# Patient Record
Sex: MALE | Race: WHITE | Employment: OTHER | URBAN - METROPOLITAN AREA
[De-identification: names, ages, dates, MRNs, and addresses within clinical notes are randomized per-mention and may not be internally consistent; named-entity substitution may affect disease eponyms.]

---

## 2020-01-01 ENCOUNTER — APPOINTMENT (OUTPATIENT)
Dept: GENERAL RADIOLOGY | Age: 85
DRG: 177 | End: 2020-01-01
Attending: INTERNAL MEDICINE
Payer: MEDICARE

## 2020-01-01 ENCOUNTER — APPOINTMENT (OUTPATIENT)
Dept: CT IMAGING | Age: 85
DRG: 177 | End: 2020-01-01
Attending: INTERNAL MEDICINE
Payer: MEDICARE

## 2020-01-01 ENCOUNTER — HOSPITAL ENCOUNTER (INPATIENT)
Age: 85
LOS: 8 days | DRG: 177 | End: 2020-08-13
Attending: INTERNAL MEDICINE | Admitting: INTERNAL MEDICINE
Payer: MEDICARE

## 2020-01-01 VITALS
WEIGHT: 160.05 LBS | TEMPERATURE: 98.3 F | OXYGEN SATURATION: 62 % | SYSTOLIC BLOOD PRESSURE: 102 MMHG | BODY MASS INDEX: 24.26 KG/M2 | RESPIRATION RATE: 15 BRPM | HEART RATE: 96 BPM | DIASTOLIC BLOOD PRESSURE: 53 MMHG | HEIGHT: 68 IN

## 2020-01-01 LAB
-: NORMAL
ABO/RH: NORMAL
ABSOLUTE EOS #: 0 K/UL (ref 0–0.4)
ABSOLUTE EOS #: 0 K/UL (ref 0–0.4)
ABSOLUTE EOS #: 0 K/UL (ref 0–0.44)
ABSOLUTE EOS #: 0 K/UL (ref 0–0.44)
ABSOLUTE EOS #: <0.03 K/UL (ref 0–0.44)
ABSOLUTE IMMATURE GRANULOCYTE: 0 K/UL (ref 0–0.3)
ABSOLUTE IMMATURE GRANULOCYTE: 0 K/UL (ref 0–0.3)
ABSOLUTE IMMATURE GRANULOCYTE: 0.08 K/UL (ref 0–0.3)
ABSOLUTE IMMATURE GRANULOCYTE: 0.09 K/UL (ref 0–0.3)
ABSOLUTE IMMATURE GRANULOCYTE: 0.14 K/UL (ref 0–0.3)
ABSOLUTE IMMATURE GRANULOCYTE: 0.21 K/UL (ref 0–0.3)
ABSOLUTE IMMATURE GRANULOCYTE: 0.21 K/UL (ref 0–0.3)
ABSOLUTE LYMPH #: 0.42 K/UL (ref 1.1–3.7)
ABSOLUTE LYMPH #: 0.46 K/UL (ref 1.1–3.7)
ABSOLUTE LYMPH #: 0.52 K/UL (ref 1–4.8)
ABSOLUTE LYMPH #: 0.69 K/UL (ref 1–4.8)
ABSOLUTE LYMPH #: 0.72 K/UL (ref 1.1–3.7)
ABSOLUTE LYMPH #: 0.77 K/UL (ref 1.1–3.7)
ABSOLUTE LYMPH #: 0.79 K/UL (ref 1.1–3.7)
ABSOLUTE MONO #: 0.39 K/UL (ref 0.1–0.8)
ABSOLUTE MONO #: 0.5 K/UL (ref 0.1–0.8)
ABSOLUTE MONO #: 0.59 K/UL (ref 0.1–1.2)
ABSOLUTE MONO #: 0.64 K/UL (ref 0.1–1.2)
ABSOLUTE MONO #: 0.66 K/UL (ref 0.1–1.2)
ABSOLUTE MONO #: 0.73 K/UL (ref 0.1–1.2)
ABSOLUTE MONO #: 0.76 K/UL (ref 0.1–1.2)
ALBUMIN SERPL-MCNC: 3.2 G/DL (ref 3.5–5.2)
ALBUMIN/GLOBULIN RATIO: 1.1 (ref 1–2.5)
ALBUMIN/GLOBULIN RATIO: 1.1 (ref 1–2.5)
ALBUMIN/GLOBULIN RATIO: 1.2 (ref 1–2.5)
ALLEN TEST: POSITIVE
ALLEN TEST: POSITIVE
ALP BLD-CCNC: 76 U/L (ref 40–129)
ALP BLD-CCNC: 91 U/L (ref 40–129)
ALP BLD-CCNC: 94 U/L (ref 40–129)
ALT SERPL-CCNC: 29 U/L (ref 5–41)
ALT SERPL-CCNC: 33 U/L (ref 5–41)
ALT SERPL-CCNC: 34 U/L (ref 5–41)
ANION GAP SERPL CALCULATED.3IONS-SCNC: 13 MMOL/L (ref 9–17)
ANION GAP SERPL CALCULATED.3IONS-SCNC: 16 MMOL/L (ref 9–17)
ANION GAP SERPL CALCULATED.3IONS-SCNC: 17 MMOL/L (ref 9–17)
ANION GAP SERPL CALCULATED.3IONS-SCNC: 18 MMOL/L (ref 9–17)
ANION GAP SERPL CALCULATED.3IONS-SCNC: 20 MMOL/L (ref 9–17)
AST SERPL-CCNC: 49 U/L
AST SERPL-CCNC: 63 U/L
AST SERPL-CCNC: 66 U/L
BASOPHILS # BLD: 0 % (ref 0–2)
BASOPHILS ABSOLUTE: 0 K/UL (ref 0–0.2)
BASOPHILS ABSOLUTE: 0.03 K/UL (ref 0–0.2)
BASOPHILS ABSOLUTE: <0.03 K/UL (ref 0–0.2)
BASOPHILS ABSOLUTE: <0.03 K/UL (ref 0–0.2)
BILIRUB SERPL-MCNC: 0.96 MG/DL (ref 0.3–1.2)
BILIRUB SERPL-MCNC: 1 MG/DL (ref 0.3–1.2)
BILIRUB SERPL-MCNC: 1.06 MG/DL (ref 0.3–1.2)
BLD PROD TYP BPU: NORMAL
BNP INTERPRETATION: ABNORMAL
BUN BLDV-MCNC: 53 MG/DL (ref 8–23)
BUN BLDV-MCNC: 56 MG/DL (ref 8–23)
BUN BLDV-MCNC: 59 MG/DL (ref 8–23)
BUN BLDV-MCNC: 62 MG/DL (ref 8–23)
BUN BLDV-MCNC: 63 MG/DL (ref 8–23)
BUN BLDV-MCNC: 66 MG/DL (ref 8–23)
BUN BLDV-MCNC: 69 MG/DL (ref 8–23)
BUN/CREAT BLD: ABNORMAL (ref 9–20)
C-REACTIVE PROTEIN: 140.9 MG/L (ref 0–5)
CALCIUM SERPL-MCNC: 8 MG/DL (ref 8.6–10.4)
CALCIUM SERPL-MCNC: 8.4 MG/DL (ref 8.6–10.4)
CALCIUM SERPL-MCNC: 8.7 MG/DL (ref 8.6–10.4)
CALCIUM SERPL-MCNC: 8.9 MG/DL (ref 8.6–10.4)
CHLORIDE BLD-SCNC: 105 MMOL/L (ref 98–107)
CHLORIDE BLD-SCNC: 105 MMOL/L (ref 98–107)
CHLORIDE BLD-SCNC: 106 MMOL/L (ref 98–107)
CHLORIDE BLD-SCNC: 106 MMOL/L (ref 98–107)
CHLORIDE BLD-SCNC: 107 MMOL/L (ref 98–107)
CHLORIDE BLD-SCNC: 109 MMOL/L (ref 98–107)
CHLORIDE BLD-SCNC: 109 MMOL/L (ref 98–107)
CO2: 13 MMOL/L (ref 20–31)
CO2: 15 MMOL/L (ref 20–31)
CO2: 16 MMOL/L (ref 20–31)
CO2: 16 MMOL/L (ref 20–31)
CO2: 17 MMOL/L (ref 20–31)
CO2: 18 MMOL/L (ref 20–31)
CO2: 21 MMOL/L (ref 20–31)
CREAT SERPL-MCNC: 1.15 MG/DL (ref 0.7–1.2)
CREAT SERPL-MCNC: 1.22 MG/DL (ref 0.7–1.2)
CREAT SERPL-MCNC: 1.36 MG/DL (ref 0.7–1.2)
CREAT SERPL-MCNC: 1.39 MG/DL (ref 0.7–1.2)
CREAT SERPL-MCNC: 1.48 MG/DL (ref 0.7–1.2)
CREAT SERPL-MCNC: 1.48 MG/DL (ref 0.7–1.2)
CREAT SERPL-MCNC: 1.65 MG/DL (ref 0.7–1.2)
D-DIMER QUANTITATIVE: 0.72 MG/L FEU
D-DIMER QUANTITATIVE: 0.87 MG/L FEU
D-DIMER QUANTITATIVE: 1.02 MG/L FEU
D-DIMER QUANTITATIVE: 1.46 MG/L FEU
DIFFERENTIAL TYPE: ABNORMAL
DISPENSE STATUS BLOOD BANK: NORMAL
EKG ATRIAL RATE: 73 BPM
EKG ATRIAL RATE: 75 BPM
EKG P AXIS: -22 DEGREES
EKG P AXIS: -4 DEGREES
EKG P-R INTERVAL: 172 MS
EKG P-R INTERVAL: 174 MS
EKG Q-T INTERVAL: 426 MS
EKG Q-T INTERVAL: 436 MS
EKG QRS DURATION: 80 MS
EKG QRS DURATION: 86 MS
EKG QTC CALCULATION (BAZETT): 469 MS
EKG QTC CALCULATION (BAZETT): 486 MS
EKG R AXIS: -26 DEGREES
EKG R AXIS: -36 DEGREES
EKG T AXIS: -11 DEGREES
EKG T AXIS: 117 DEGREES
EKG VENTRICULAR RATE: 73 BPM
EKG VENTRICULAR RATE: 75 BPM
EOSINOPHILS RELATIVE PERCENT: 0 % (ref 1–4)
FERRITIN: 5676 UG/L (ref 30–400)
FERRITIN: 7343 UG/L (ref 30–400)
FIBRINOGEN: 574 MG/DL (ref 140–420)
FIBRINOGEN: 665 MG/DL (ref 140–420)
FIO2: 15
FIO2: 15
GFR AFRICAN AMERICAN: 48 ML/MIN
GFR AFRICAN AMERICAN: 54 ML/MIN
GFR AFRICAN AMERICAN: 54 ML/MIN
GFR AFRICAN AMERICAN: 58 ML/MIN
GFR AFRICAN AMERICAN: 60 ML/MIN
GFR AFRICAN AMERICAN: >60 ML/MIN
GFR AFRICAN AMERICAN: >60 ML/MIN
GFR NON-AFRICAN AMERICAN: 40 ML/MIN
GFR NON-AFRICAN AMERICAN: 45 ML/MIN
GFR NON-AFRICAN AMERICAN: 45 ML/MIN
GFR NON-AFRICAN AMERICAN: 48 ML/MIN
GFR NON-AFRICAN AMERICAN: 49 ML/MIN
GFR NON-AFRICAN AMERICAN: 56 ML/MIN
GFR NON-AFRICAN AMERICAN: >60 ML/MIN
GFR SERPL CREATININE-BSD FRML MDRD: ABNORMAL ML/MIN/{1.73_M2}
GLUCOSE BLD-MCNC: 104 MG/DL (ref 75–110)
GLUCOSE BLD-MCNC: 128 MG/DL (ref 75–110)
GLUCOSE BLD-MCNC: 129 MG/DL (ref 75–110)
GLUCOSE BLD-MCNC: 147 MG/DL (ref 70–99)
GLUCOSE BLD-MCNC: 148 MG/DL (ref 75–110)
GLUCOSE BLD-MCNC: 148 MG/DL (ref 75–110)
GLUCOSE BLD-MCNC: 150 MG/DL (ref 75–110)
GLUCOSE BLD-MCNC: 152 MG/DL (ref 75–110)
GLUCOSE BLD-MCNC: 153 MG/DL (ref 75–110)
GLUCOSE BLD-MCNC: 158 MG/DL (ref 75–110)
GLUCOSE BLD-MCNC: 160 MG/DL (ref 75–110)
GLUCOSE BLD-MCNC: 160 MG/DL (ref 75–110)
GLUCOSE BLD-MCNC: 161 MG/DL (ref 70–99)
GLUCOSE BLD-MCNC: 162 MG/DL (ref 75–110)
GLUCOSE BLD-MCNC: 164 MG/DL (ref 75–110)
GLUCOSE BLD-MCNC: 172 MG/DL (ref 75–110)
GLUCOSE BLD-MCNC: 173 MG/DL (ref 75–110)
GLUCOSE BLD-MCNC: 177 MG/DL (ref 75–110)
GLUCOSE BLD-MCNC: 177 MG/DL (ref 75–110)
GLUCOSE BLD-MCNC: 178 MG/DL (ref 70–99)
GLUCOSE BLD-MCNC: 179 MG/DL (ref 70–99)
GLUCOSE BLD-MCNC: 179 MG/DL (ref 75–110)
GLUCOSE BLD-MCNC: 185 MG/DL (ref 75–110)
GLUCOSE BLD-MCNC: 185 MG/DL (ref 75–110)
GLUCOSE BLD-MCNC: 187 MG/DL (ref 75–110)
GLUCOSE BLD-MCNC: 190 MG/DL (ref 75–110)
GLUCOSE BLD-MCNC: 194 MG/DL (ref 75–110)
GLUCOSE BLD-MCNC: 195 MG/DL (ref 75–110)
GLUCOSE BLD-MCNC: 201 MG/DL (ref 70–99)
GLUCOSE BLD-MCNC: 208 MG/DL (ref 75–110)
GLUCOSE BLD-MCNC: 215 MG/DL (ref 70–99)
GLUCOSE BLD-MCNC: 225 MG/DL (ref 75–110)
GLUCOSE BLD-MCNC: 230 MG/DL (ref 75–110)
GLUCOSE BLD-MCNC: 239 MG/DL (ref 75–110)
GLUCOSE BLD-MCNC: 242 MG/DL (ref 75–110)
GLUCOSE BLD-MCNC: 251 MG/DL (ref 75–110)
GLUCOSE BLD-MCNC: 255 MG/DL (ref 75–110)
GLUCOSE BLD-MCNC: 266 MG/DL (ref 75–110)
GLUCOSE BLD-MCNC: 269 MG/DL (ref 75–110)
GLUCOSE BLD-MCNC: 278 MG/DL (ref 70–99)
GLUCOSE BLD-MCNC: 295 MG/DL (ref 75–110)
GLUCOSE BLD-MCNC: 57 MG/DL (ref 75–110)
GLUCOSE BLD-MCNC: 80 MG/DL (ref 75–110)
HCO3 VENOUS: 20.7 MMOL/L (ref 22–29)
HCT VFR BLD CALC: 30.7 % (ref 40.7–50.3)
HCT VFR BLD CALC: 33.2 % (ref 40.7–50.3)
HCT VFR BLD CALC: 33.9 % (ref 40.7–50.3)
HCT VFR BLD CALC: 35.5 % (ref 40.7–50.3)
HCT VFR BLD CALC: 35.9 % (ref 40.7–50.3)
HCT VFR BLD CALC: 38.4 % (ref 40.7–50.3)
HCT VFR BLD CALC: 40.5 % (ref 40.7–50.3)
HEMOGLOBIN: 10.6 G/DL (ref 13–17)
HEMOGLOBIN: 10.8 G/DL (ref 13–17)
HEMOGLOBIN: 11.3 G/DL (ref 13–17)
HEMOGLOBIN: 11.8 G/DL (ref 13–17)
HEMOGLOBIN: 12.4 G/DL (ref 13–17)
HEMOGLOBIN: 13.2 G/DL (ref 13–17)
HEMOGLOBIN: 9.9 G/DL (ref 13–17)
IMMATURE GRANULOCYTES: 0 %
IMMATURE GRANULOCYTES: 0 %
IMMATURE GRANULOCYTES: 1 %
IMMATURE GRANULOCYTES: 2 %
IMMATURE GRANULOCYTES: 2 %
INR BLD: 1.1
LACTATE DEHYDROGENASE: 803 U/L (ref 135–225)
LACTIC ACID, WHOLE BLOOD: 2.3 MMOL/L (ref 0.7–2.1)
LACTIC ACID: ABNORMAL MMOL/L
LYMPHOCYTES # BLD: 5 % (ref 24–43)
LYMPHOCYTES # BLD: 5 % (ref 24–43)
LYMPHOCYTES # BLD: 6 % (ref 24–43)
LYMPHOCYTES # BLD: 6 % (ref 24–43)
LYMPHOCYTES # BLD: 7 % (ref 24–43)
LYMPHOCYTES # BLD: 7 % (ref 24–44)
LYMPHOCYTES # BLD: 8 % (ref 24–44)
MAGNESIUM: 2.4 MG/DL (ref 1.6–2.6)
MAGNESIUM: 2.5 MG/DL (ref 1.6–2.6)
MCH RBC QN AUTO: 30.1 PG (ref 25.2–33.5)
MCH RBC QN AUTO: 30.6 PG (ref 25.2–33.5)
MCH RBC QN AUTO: 30.7 PG (ref 25.2–33.5)
MCH RBC QN AUTO: 30.8 PG (ref 25.2–33.5)
MCH RBC QN AUTO: 30.9 PG (ref 25.2–33.5)
MCH RBC QN AUTO: 30.9 PG (ref 25.2–33.5)
MCH RBC QN AUTO: 31 PG (ref 25.2–33.5)
MCHC RBC AUTO-ENTMCNC: 31.3 G/DL (ref 28.4–34.8)
MCHC RBC AUTO-ENTMCNC: 31.8 G/DL (ref 28.4–34.8)
MCHC RBC AUTO-ENTMCNC: 32.2 G/DL (ref 28.4–34.8)
MCHC RBC AUTO-ENTMCNC: 32.3 G/DL (ref 28.4–34.8)
MCHC RBC AUTO-ENTMCNC: 32.5 G/DL (ref 28.4–34.8)
MCHC RBC AUTO-ENTMCNC: 32.6 G/DL (ref 28.4–34.8)
MCHC RBC AUTO-ENTMCNC: 32.9 G/DL (ref 28.4–34.8)
MCV RBC AUTO: 94 FL (ref 82.6–102.9)
MCV RBC AUTO: 94.4 FL (ref 82.6–102.9)
MCV RBC AUTO: 94.8 FL (ref 82.6–102.9)
MCV RBC AUTO: 95.1 FL (ref 82.6–102.9)
MCV RBC AUTO: 95.3 FL (ref 82.6–102.9)
MCV RBC AUTO: 96.3 FL (ref 82.6–102.9)
MCV RBC AUTO: 97.3 FL (ref 82.6–102.9)
MODE: ABNORMAL
MODE: ABNORMAL
MONOCYTES # BLD: 5 % (ref 1–7)
MONOCYTES # BLD: 5 % (ref 3–12)
MONOCYTES # BLD: 6 % (ref 1–7)
MONOCYTES # BLD: 6 % (ref 3–12)
MONOCYTES # BLD: 7 % (ref 3–12)
MORPHOLOGY: NORMAL
MYOGLOBIN: 103 NG/ML (ref 28–72)
MYOGLOBIN: 127 NG/ML (ref 28–72)
MYOGLOBIN: 129 NG/ML (ref 28–72)
NEGATIVE BASE EXCESS, ART: 3 (ref 0–2)
NEGATIVE BASE EXCESS, VEN: 2 (ref 0–2)
NRBC AUTOMATED: 0 PER 100 WBC
NRBC AUTOMATED: 0.2 PER 100 WBC
NRBC AUTOMATED: 0.2 PER 100 WBC
NRBC AUTOMATED: 0.3 PER 100 WBC
O2 DEVICE/FLOW/%: ABNORMAL
O2 DEVICE/FLOW/%: ABNORMAL
O2 SAT, VEN: 45 % (ref 60–85)
PARTIAL THROMBOPLASTIN TIME: 26.1 SEC (ref 20.5–30.5)
PATIENT TEMP: ABNORMAL
PATIENT TEMP: ABNORMAL
PCO2, VEN: 28.6 MM HG (ref 41–51)
PDW BLD-RTO: 13.6 % (ref 11.8–14.4)
PDW BLD-RTO: 13.7 % (ref 11.8–14.4)
PDW BLD-RTO: 13.8 % (ref 11.8–14.4)
PDW BLD-RTO: 13.9 % (ref 11.8–14.4)
PDW BLD-RTO: 13.9 % (ref 11.8–14.4)
PDW BLD-RTO: 14.1 % (ref 11.8–14.4)
PDW BLD-RTO: 14.1 % (ref 11.8–14.4)
PH VENOUS: 7.47 (ref 7.32–7.43)
PLATELET # BLD: 111 K/UL (ref 138–453)
PLATELET # BLD: 112 K/UL (ref 138–453)
PLATELET # BLD: 113 K/UL (ref 138–453)
PLATELET # BLD: 113 K/UL (ref 138–453)
PLATELET # BLD: 116 K/UL (ref 138–453)
PLATELET # BLD: 134 K/UL (ref 138–453)
PLATELET # BLD: 98 K/UL (ref 138–453)
PLATELET ESTIMATE: ABNORMAL
PMV BLD AUTO: 10 FL (ref 8.1–13.5)
PMV BLD AUTO: 10.4 FL (ref 8.1–13.5)
PMV BLD AUTO: 10.5 FL (ref 8.1–13.5)
PMV BLD AUTO: 10.9 FL (ref 8.1–13.5)
PMV BLD AUTO: 12.7 FL (ref 8.1–13.5)
PMV BLD AUTO: 9.6 FL (ref 8.1–13.5)
PMV BLD AUTO: 9.7 FL (ref 8.1–13.5)
PO2, VEN: 22.7 MM HG (ref 30–50)
POC HCO3: 19.6 MMOL/L (ref 21–28)
POC O2 SATURATION: 95 % (ref 94–98)
POC PCO2 TEMP: ABNORMAL MM HG
POC PCO2 TEMP: ABNORMAL MM HG
POC PCO2: 27 MM HG (ref 35–48)
POC PH TEMP: ABNORMAL
POC PH TEMP: ABNORMAL
POC PH: 7.47 (ref 7.35–7.45)
POC PO2 TEMP: ABNORMAL MM HG
POC PO2 TEMP: ABNORMAL MM HG
POC PO2: 69.8 MM HG (ref 83–108)
POSITIVE BASE EXCESS, ART: ABNORMAL (ref 0–3)
POSITIVE BASE EXCESS, VEN: ABNORMAL (ref 0–3)
POTASSIUM SERPL-SCNC: 3.9 MMOL/L (ref 3.7–5.3)
POTASSIUM SERPL-SCNC: 4.5 MMOL/L (ref 3.7–5.3)
POTASSIUM SERPL-SCNC: 4.6 MMOL/L (ref 3.7–5.3)
POTASSIUM SERPL-SCNC: 4.7 MMOL/L (ref 3.7–5.3)
POTASSIUM SERPL-SCNC: 4.7 MMOL/L (ref 3.7–5.3)
PRO-BNP: 1019 PG/ML
PROCALCITONIN: 0.32 NG/ML
PROTHROMBIN TIME: 11.4 SEC (ref 9–12)
RBC # BLD: 3.22 M/UL (ref 4.21–5.77)
RBC # BLD: 3.49 M/UL (ref 4.21–5.77)
RBC # BLD: 3.52 M/UL (ref 4.21–5.77)
RBC # BLD: 3.65 M/UL (ref 4.21–5.77)
RBC # BLD: 3.82 M/UL (ref 4.21–5.77)
RBC # BLD: 4.05 M/UL (ref 4.21–5.77)
RBC # BLD: 4.29 M/UL (ref 4.21–5.77)
RBC # BLD: ABNORMAL 10*6/UL
REASON FOR REJECTION: NORMAL
SAMPLE SITE: ABNORMAL
SAMPLE SITE: ABNORMAL
SEG NEUTROPHILS: 85 % (ref 36–65)
SEG NEUTROPHILS: 86 % (ref 36–65)
SEG NEUTROPHILS: 86 % (ref 36–66)
SEG NEUTROPHILS: 87 % (ref 36–65)
SEG NEUTROPHILS: 88 % (ref 36–66)
SEGMENTED NEUTROPHILS ABSOLUTE COUNT: 10.38 K/UL (ref 1.5–8.1)
SEGMENTED NEUTROPHILS ABSOLUTE COUNT: 10.87 K/UL (ref 1.5–8.1)
SEGMENTED NEUTROPHILS ABSOLUTE COUNT: 5.59 K/UL (ref 1.8–7.7)
SEGMENTED NEUTROPHILS ABSOLUTE COUNT: 7.31 K/UL (ref 1.5–8.1)
SEGMENTED NEUTROPHILS ABSOLUTE COUNT: 8.01 K/UL (ref 1.5–8.1)
SEGMENTED NEUTROPHILS ABSOLUTE COUNT: 8.71 K/UL (ref 1.8–7.7)
SEGMENTED NEUTROPHILS ABSOLUTE COUNT: 9.31 K/UL (ref 1.5–8.1)
SODIUM BLD-SCNC: 137 MMOL/L (ref 135–144)
SODIUM BLD-SCNC: 138 MMOL/L (ref 135–144)
SODIUM BLD-SCNC: 140 MMOL/L (ref 135–144)
SODIUM BLD-SCNC: 140 MMOL/L (ref 135–144)
SODIUM BLD-SCNC: 141 MMOL/L (ref 135–144)
SODIUM BLD-SCNC: 142 MMOL/L (ref 135–144)
SODIUM BLD-SCNC: 143 MMOL/L (ref 135–144)
TCO2 (CALC), ART: 21 MMOL/L (ref 22–29)
TOTAL CO2, VENOUS: 22 MMOL/L (ref 23–30)
TOTAL PROTEIN: 5.8 G/DL (ref 6.4–8.3)
TOTAL PROTEIN: 6.1 G/DL (ref 6.4–8.3)
TOTAL PROTEIN: 6.1 G/DL (ref 6.4–8.3)
TRANSFUSION STATUS: NORMAL
TROPONIN INTERP: ABNORMAL
TROPONIN T: ABNORMAL NG/ML
TROPONIN, HIGH SENSITIVITY: 27 NG/L (ref 0–22)
TROPONIN, HIGH SENSITIVITY: 31 NG/L (ref 0–22)
TROPONIN, HIGH SENSITIVITY: 31 NG/L (ref 0–22)
UNIT DIVISION: 0
UNIT NUMBER: NORMAL
WBC # BLD: 11 K/UL (ref 3.5–11.3)
WBC # BLD: 12.1 K/UL (ref 3.5–11.3)
WBC # BLD: 12.5 K/UL (ref 3.5–11.3)
WBC # BLD: 6.5 K/UL (ref 3.5–11.3)
WBC # BLD: 8.4 K/UL (ref 3.5–11.3)
WBC # BLD: 9.2 K/UL (ref 3.5–11.3)
WBC # BLD: 9.9 K/UL (ref 3.5–11.3)
WBC # BLD: ABNORMAL 10*3/UL
ZZ NTE CLEAN UP: ORDERED TEST: NORMAL
ZZ NTE WITH NAME CLEAN UP: SPECIMEN SOURCE: NORMAL

## 2020-01-01 PROCEDURE — 6360000002 HC RX W HCPCS: Performed by: NURSE PRACTITIONER

## 2020-01-01 PROCEDURE — 93010 ELECTROCARDIOGRAM REPORT: CPT | Performed by: INTERNAL MEDICINE

## 2020-01-01 PROCEDURE — 83735 ASSAY OF MAGNESIUM: CPT

## 2020-01-01 PROCEDURE — 6370000000 HC RX 637 (ALT 250 FOR IP): Performed by: INTERNAL MEDICINE

## 2020-01-01 PROCEDURE — 94761 N-INVAS EAR/PLS OXIMETRY MLT: CPT

## 2020-01-01 PROCEDURE — 85025 COMPLETE CBC W/AUTO DIFF WBC: CPT

## 2020-01-01 PROCEDURE — 82947 ASSAY GLUCOSE BLOOD QUANT: CPT

## 2020-01-01 PROCEDURE — 2500000003 HC RX 250 WO HCPCS: Performed by: INTERNAL MEDICINE

## 2020-01-01 PROCEDURE — 2580000003 HC RX 258: Performed by: NURSE PRACTITIONER

## 2020-01-01 PROCEDURE — 6360000002 HC RX W HCPCS: Performed by: CLINICAL NURSE SPECIALIST

## 2020-01-01 PROCEDURE — 85379 FIBRIN DEGRADATION QUANT: CPT

## 2020-01-01 PROCEDURE — 2060000000 HC ICU INTERMEDIATE R&B

## 2020-01-01 PROCEDURE — 84484 ASSAY OF TROPONIN QUANT: CPT

## 2020-01-01 PROCEDURE — 83874 ASSAY OF MYOGLOBIN: CPT

## 2020-01-01 PROCEDURE — 99232 SBSQ HOSP IP/OBS MODERATE 35: CPT | Performed by: INTERNAL MEDICINE

## 2020-01-01 PROCEDURE — 2700000000 HC OXYGEN THERAPY PER DAY

## 2020-01-01 PROCEDURE — 6370000000 HC RX 637 (ALT 250 FOR IP): Performed by: CLINICAL NURSE SPECIALIST

## 2020-01-01 PROCEDURE — 84145 PROCALCITONIN (PCT): CPT

## 2020-01-01 PROCEDURE — 99233 SBSQ HOSP IP/OBS HIGH 50: CPT | Performed by: INTERNAL MEDICINE

## 2020-01-01 PROCEDURE — 80048 BASIC METABOLIC PNL TOTAL CA: CPT

## 2020-01-01 PROCEDURE — 97530 THERAPEUTIC ACTIVITIES: CPT

## 2020-01-01 PROCEDURE — 2580000003 HC RX 258: Performed by: CLINICAL NURSE SPECIALIST

## 2020-01-01 PROCEDURE — 97110 THERAPEUTIC EXERCISES: CPT

## 2020-01-01 PROCEDURE — 36600 WITHDRAWAL OF ARTERIAL BLOOD: CPT

## 2020-01-01 PROCEDURE — 85730 THROMBOPLASTIN TIME PARTIAL: CPT

## 2020-01-01 PROCEDURE — 71045 X-RAY EXAM CHEST 1 VIEW: CPT

## 2020-01-01 PROCEDURE — 86900 BLOOD TYPING SEROLOGIC ABO: CPT

## 2020-01-01 PROCEDURE — 99222 1ST HOSP IP/OBS MODERATE 55: CPT | Performed by: CLINICAL NURSE SPECIALIST

## 2020-01-01 PROCEDURE — 82803 BLOOD GASES ANY COMBINATION: CPT

## 2020-01-01 PROCEDURE — 99222 1ST HOSP IP/OBS MODERATE 55: CPT | Performed by: INTERNAL MEDICINE

## 2020-01-01 PROCEDURE — 97535 SELF CARE MNGMENT TRAINING: CPT

## 2020-01-01 PROCEDURE — 93325 DOPPLER ECHO COLOR FLOW MAPG: CPT

## 2020-01-01 PROCEDURE — 74018 RADEX ABDOMEN 1 VIEW: CPT

## 2020-01-01 PROCEDURE — 83605 ASSAY OF LACTIC ACID: CPT

## 2020-01-01 PROCEDURE — 85610 PROTHROMBIN TIME: CPT

## 2020-01-01 PROCEDURE — 2580000003 HC RX 258: Performed by: INTERNAL MEDICINE

## 2020-01-01 PROCEDURE — 80053 COMPREHEN METABOLIC PANEL: CPT

## 2020-01-01 PROCEDURE — 97162 PT EVAL MOD COMPLEX 30 MIN: CPT

## 2020-01-01 PROCEDURE — 93005 ELECTROCARDIOGRAM TRACING: CPT | Performed by: NURSE PRACTITIONER

## 2020-01-01 PROCEDURE — 85384 FIBRINOGEN ACTIVITY: CPT

## 2020-01-01 PROCEDURE — 97166 OT EVAL MOD COMPLEX 45 MIN: CPT

## 2020-01-01 PROCEDURE — 6370000000 HC RX 637 (ALT 250 FOR IP): Performed by: NURSE PRACTITIONER

## 2020-01-01 PROCEDURE — 93005 ELECTROCARDIOGRAM TRACING: CPT | Performed by: INTERNAL MEDICINE

## 2020-01-01 PROCEDURE — 82728 ASSAY OF FERRITIN: CPT

## 2020-01-01 PROCEDURE — 86140 C-REACTIVE PROTEIN: CPT

## 2020-01-01 PROCEDURE — 83615 LACTATE (LD) (LDH) ENZYME: CPT

## 2020-01-01 PROCEDURE — 86901 BLOOD TYPING SEROLOGIC RH(D): CPT

## 2020-01-01 PROCEDURE — 6360000002 HC RX W HCPCS: Performed by: INTERNAL MEDICINE

## 2020-01-01 PROCEDURE — 93308 TTE F-UP OR LMTD: CPT

## 2020-01-01 PROCEDURE — 83880 ASSAY OF NATRIURETIC PEPTIDE: CPT

## 2020-01-01 RX ORDER — NICOTINE 21 MG/24HR
1 PATCH, TRANSDERMAL 24 HOURS TRANSDERMAL DAILY PRN
Status: DISCONTINUED | OUTPATIENT
Start: 2020-01-01 | End: 2020-01-01

## 2020-01-01 RX ORDER — METOPROLOL TARTRATE 50 MG/1
50 TABLET, FILM COATED ORAL 2 TIMES DAILY
Status: DISCONTINUED | OUTPATIENT
Start: 2020-01-01 | End: 2020-08-14 | Stop reason: HOSPADM

## 2020-01-01 RX ORDER — M-VIT,TX,IRON,MINS/CALC/FOLIC 27MG-0.4MG
1 TABLET ORAL DAILY
Status: DISCONTINUED | OUTPATIENT
Start: 2020-01-01 | End: 2020-08-14 | Stop reason: HOSPADM

## 2020-01-01 RX ORDER — LORAZEPAM 2 MG/ML
0.5 INJECTION INTRAMUSCULAR ONCE
Status: COMPLETED | OUTPATIENT
Start: 2020-01-01 | End: 2020-01-01

## 2020-01-01 RX ORDER — DEXTROSE MONOHYDRATE 25 G/50ML
12.5 INJECTION, SOLUTION INTRAVENOUS PRN
Status: DISCONTINUED | OUTPATIENT
Start: 2020-01-01 | End: 2020-08-14 | Stop reason: HOSPADM

## 2020-01-01 RX ORDER — INSULIN GLARGINE 100 [IU]/ML
30 INJECTION, SOLUTION SUBCUTANEOUS NIGHTLY
Status: DISCONTINUED | OUTPATIENT
Start: 2020-01-01 | End: 2020-01-01

## 2020-01-01 RX ORDER — AMLODIPINE BESYLATE 5 MG/1
5 TABLET ORAL DAILY
COMMUNITY
End: 2020-08-14 | Stop reason: HOSPADM

## 2020-01-01 RX ORDER — AMLODIPINE BESYLATE 5 MG/1
5 TABLET ORAL DAILY
Status: DISCONTINUED | OUTPATIENT
Start: 2020-01-01 | End: 2020-08-14 | Stop reason: HOSPADM

## 2020-01-01 RX ORDER — 0.9 % SODIUM CHLORIDE 0.9 %
250 INTRAVENOUS SOLUTION INTRAVENOUS ONCE
Status: COMPLETED | OUTPATIENT
Start: 2020-01-01 | End: 2020-01-01

## 2020-01-01 RX ORDER — METOPROLOL TARTRATE 5 MG/5ML
5 INJECTION INTRAVENOUS EVERY 6 HOURS PRN
Status: DISCONTINUED | OUTPATIENT
Start: 2020-01-01 | End: 2020-01-01

## 2020-01-01 RX ORDER — LORAZEPAM 2 MG/ML
1 INJECTION INTRAMUSCULAR
Status: DISCONTINUED | OUTPATIENT
Start: 2020-01-01 | End: 2020-08-14 | Stop reason: HOSPADM

## 2020-01-01 RX ORDER — DEXAMETHASONE SODIUM PHOSPHATE 4 MG/ML
6 INJECTION, SOLUTION INTRA-ARTICULAR; INTRALESIONAL; INTRAMUSCULAR; INTRAVENOUS; SOFT TISSUE DAILY
Status: DISCONTINUED | OUTPATIENT
Start: 2020-01-01 | End: 2020-01-01

## 2020-01-01 RX ORDER — DEXTROSE MONOHYDRATE 50 MG/ML
100 INJECTION, SOLUTION INTRAVENOUS PRN
Status: DISCONTINUED | OUTPATIENT
Start: 2020-01-01 | End: 2020-08-14 | Stop reason: HOSPADM

## 2020-01-01 RX ORDER — 0.9 % SODIUM CHLORIDE 0.9 %
30 INTRAVENOUS SOLUTION INTRAVENOUS PRN
Status: DISCONTINUED | OUTPATIENT
Start: 2020-01-01 | End: 2020-08-14 | Stop reason: HOSPADM

## 2020-01-01 RX ORDER — POTASSIUM CHLORIDE 20 MEQ/1
40 TABLET, EXTENDED RELEASE ORAL PRN
Status: DISCONTINUED | OUTPATIENT
Start: 2020-01-01 | End: 2020-08-14 | Stop reason: HOSPADM

## 2020-01-01 RX ORDER — IBUPROFEN 200 MG
950 CAPSULE ORAL DAILY
Status: DISCONTINUED | OUTPATIENT
Start: 2020-01-01 | End: 2020-08-14 | Stop reason: HOSPADM

## 2020-01-01 RX ORDER — AMLODIPINE BESYLATE 5 MG/1
5 TABLET ORAL DAILY
Status: DISCONTINUED | OUTPATIENT
Start: 2020-01-01 | End: 2020-01-01

## 2020-01-01 RX ORDER — HEPARIN SODIUM 5000 [USP'U]/ML
5000 INJECTION, SOLUTION INTRAVENOUS; SUBCUTANEOUS EVERY 8 HOURS SCHEDULED
Status: DISCONTINUED | OUTPATIENT
Start: 2020-01-01 | End: 2020-01-01

## 2020-01-01 RX ORDER — DEXTROSE AND SODIUM CHLORIDE 5; .9 G/100ML; G/100ML
INJECTION, SOLUTION INTRAVENOUS CONTINUOUS
Status: DISCONTINUED | OUTPATIENT
Start: 2020-01-01 | End: 2020-01-01

## 2020-01-01 RX ORDER — TRAMADOL HYDROCHLORIDE 50 MG/1
50 TABLET ORAL 2 TIMES DAILY PRN
COMMUNITY
End: 2020-08-14 | Stop reason: HOSPADM

## 2020-01-01 RX ORDER — NICOTINE POLACRILEX 4 MG
15 LOZENGE BUCCAL PRN
Status: DISCONTINUED | OUTPATIENT
Start: 2020-01-01 | End: 2020-08-14 | Stop reason: HOSPADM

## 2020-01-01 RX ORDER — IBUPROFEN 200 MG
1 CAPSULE ORAL DAILY
COMMUNITY
End: 2020-08-14 | Stop reason: HOSPADM

## 2020-01-01 RX ORDER — ACETAMINOPHEN 325 MG/1
325 TABLET ORAL EVERY 6 HOURS PRN
COMMUNITY
End: 2020-08-14 | Stop reason: HOSPADM

## 2020-01-01 RX ORDER — SODIUM CHLORIDE 0.9 % (FLUSH) 0.9 %
10 SYRINGE (ML) INJECTION EVERY 12 HOURS SCHEDULED
Status: DISCONTINUED | OUTPATIENT
Start: 2020-01-01 | End: 2020-08-14 | Stop reason: HOSPADM

## 2020-01-01 RX ORDER — TRAMADOL HYDROCHLORIDE 50 MG/1
50 TABLET ORAL EVERY 6 HOURS PRN
Status: DISCONTINUED | OUTPATIENT
Start: 2020-01-01 | End: 2020-08-14 | Stop reason: HOSPADM

## 2020-01-01 RX ORDER — MULTIVIT WITH MINERALS/LUTEIN
1 TABLET ORAL DAILY
COMMUNITY
End: 2020-08-14 | Stop reason: HOSPADM

## 2020-01-01 RX ORDER — LORAZEPAM 2 MG/ML
0.25 INJECTION INTRAMUSCULAR EVERY 6 HOURS PRN
Status: DISCONTINUED | OUTPATIENT
Start: 2020-01-01 | End: 2020-08-14 | Stop reason: HOSPADM

## 2020-01-01 RX ORDER — BISOPROLOL FUMARATE 10 MG/1
20 TABLET ORAL DAILY
COMMUNITY
End: 2020-08-14 | Stop reason: HOSPADM

## 2020-01-01 RX ORDER — INSULIN DEGLUDEC INJECTION 100 U/ML
0.8 INJECTION, SOLUTION SUBCUTANEOUS
COMMUNITY
End: 2020-08-14 | Stop reason: HOSPADM

## 2020-01-01 RX ORDER — 0.9 % SODIUM CHLORIDE 0.9 %
20 INTRAVENOUS SOLUTION INTRAVENOUS ONCE
Status: DISCONTINUED | OUTPATIENT
Start: 2020-01-01 | End: 2020-08-14 | Stop reason: HOSPADM

## 2020-01-01 RX ORDER — MAGNESIUM SULFATE 1 G/100ML
1 INJECTION INTRAVENOUS PRN
Status: DISCONTINUED | OUTPATIENT
Start: 2020-01-01 | End: 2020-08-14 | Stop reason: HOSPADM

## 2020-01-01 RX ORDER — BACLOFEN 10 MG/1
5 TABLET ORAL 3 TIMES DAILY PRN
Status: DISCONTINUED | OUTPATIENT
Start: 2020-01-01 | End: 2020-08-14 | Stop reason: HOSPADM

## 2020-01-01 RX ORDER — PROMETHAZINE HYDROCHLORIDE 25 MG/1
12.5 TABLET ORAL EVERY 6 HOURS PRN
Status: DISCONTINUED | OUTPATIENT
Start: 2020-01-01 | End: 2020-08-14 | Stop reason: HOSPADM

## 2020-01-01 RX ORDER — CHLORTHALIDONE 25 MG/1
25 TABLET ORAL DAILY
COMMUNITY
End: 2020-08-14 | Stop reason: HOSPADM

## 2020-01-01 RX ORDER — BUMETANIDE 0.25 MG/ML
1 INJECTION, SOLUTION INTRAMUSCULAR; INTRAVENOUS DAILY
Status: COMPLETED | OUTPATIENT
Start: 2020-01-01 | End: 2020-01-01

## 2020-01-01 RX ORDER — INSULIN GLARGINE 100 [IU]/ML
20 INJECTION, SOLUTION SUBCUTANEOUS NIGHTLY
Status: DISCONTINUED | OUTPATIENT
Start: 2020-01-01 | End: 2020-01-01

## 2020-01-01 RX ORDER — SODIUM CHLORIDE 0.9 % (FLUSH) 0.9 %
10 SYRINGE (ML) INJECTION PRN
Status: DISCONTINUED | OUTPATIENT
Start: 2020-01-01 | End: 2020-08-14 | Stop reason: HOSPADM

## 2020-01-01 RX ORDER — OMEPRAZOLE 20 MG/1
20 CAPSULE, DELAYED RELEASE ORAL DAILY
COMMUNITY
End: 2020-08-14 | Stop reason: HOSPADM

## 2020-01-01 RX ORDER — MORPHINE SULFATE 2 MG/ML
2 INJECTION, SOLUTION INTRAMUSCULAR; INTRAVENOUS
Status: DISCONTINUED | OUTPATIENT
Start: 2020-01-01 | End: 2020-08-14 | Stop reason: HOSPADM

## 2020-01-01 RX ORDER — POTASSIUM CHLORIDE 7.45 MG/ML
10 INJECTION INTRAVENOUS PRN
Status: DISCONTINUED | OUTPATIENT
Start: 2020-01-01 | End: 2020-08-14 | Stop reason: HOSPADM

## 2020-01-01 RX ORDER — ACETAMINOPHEN 325 MG/1
325 TABLET ORAL EVERY 6 HOURS PRN
Status: DISCONTINUED | OUTPATIENT
Start: 2020-01-01 | End: 2020-01-01 | Stop reason: SDUPTHER

## 2020-01-01 RX ORDER — HALOPERIDOL 5 MG/ML
5 INJECTION INTRAMUSCULAR EVERY 6 HOURS PRN
Status: DISCONTINUED | OUTPATIENT
Start: 2020-01-01 | End: 2020-01-01

## 2020-01-01 RX ORDER — POLYETHYLENE GLYCOL 3350 17 G/17G
17 POWDER, FOR SOLUTION ORAL DAILY PRN
Status: DISCONTINUED | OUTPATIENT
Start: 2020-01-01 | End: 2020-08-14 | Stop reason: HOSPADM

## 2020-01-01 RX ORDER — ONDANSETRON 2 MG/ML
4 INJECTION INTRAMUSCULAR; INTRAVENOUS EVERY 6 HOURS PRN
Status: DISCONTINUED | OUTPATIENT
Start: 2020-01-01 | End: 2020-08-14 | Stop reason: HOSPADM

## 2020-01-01 RX ORDER — ACETAMINOPHEN 325 MG/1
650 TABLET ORAL EVERY 6 HOURS PRN
Status: DISCONTINUED | OUTPATIENT
Start: 2020-01-01 | End: 2020-08-14 | Stop reason: HOSPADM

## 2020-01-01 RX ORDER — QUETIAPINE FUMARATE 25 MG/1
25 TABLET, FILM COATED ORAL 2 TIMES DAILY
Status: DISCONTINUED | OUTPATIENT
Start: 2020-01-01 | End: 2020-08-14 | Stop reason: HOSPADM

## 2020-01-01 RX ORDER — BUMETANIDE 1 MG/1
1 TABLET ORAL DAILY
Status: DISCONTINUED | OUTPATIENT
Start: 2020-01-01 | End: 2020-08-14 | Stop reason: HOSPADM

## 2020-01-01 RX ORDER — SODIUM BICARBONATE 650 MG/1
650 TABLET ORAL 4 TIMES DAILY
Status: DISCONTINUED | OUTPATIENT
Start: 2020-01-01 | End: 2020-08-14 | Stop reason: HOSPADM

## 2020-01-01 RX ORDER — SODIUM CHLORIDE 9 MG/ML
INJECTION, SOLUTION INTRAVENOUS CONTINUOUS
Status: DISCONTINUED | OUTPATIENT
Start: 2020-01-01 | End: 2020-01-01

## 2020-01-01 RX ORDER — PHYTONADIONE (VIT K1) 1 %
500 POWDER (GRAM) MISCELLANEOUS DAILY
COMMUNITY
End: 2020-08-14 | Stop reason: HOSPADM

## 2020-01-01 RX ORDER — PANTOPRAZOLE SODIUM 40 MG/1
40 TABLET, DELAYED RELEASE ORAL
Status: DISCONTINUED | OUTPATIENT
Start: 2020-01-01 | End: 2020-08-14 | Stop reason: HOSPADM

## 2020-01-01 RX ORDER — ACETAMINOPHEN 650 MG/1
650 SUPPOSITORY RECTAL EVERY 6 HOURS PRN
Status: DISCONTINUED | OUTPATIENT
Start: 2020-01-01 | End: 2020-08-14 | Stop reason: HOSPADM

## 2020-01-01 RX ADMIN — PANTOPRAZOLE SODIUM 40 MG: 40 TABLET, DELAYED RELEASE ORAL at 09:36

## 2020-01-01 RX ADMIN — AMLODIPINE BESYLATE 5 MG: 5 TABLET ORAL at 09:33

## 2020-01-01 RX ADMIN — CALCIUM CITRATE 200 MG (950 MG) TABLET 950 MG: at 09:14

## 2020-01-01 RX ADMIN — PANTOPRAZOLE SODIUM 40 MG: 40 TABLET, DELAYED RELEASE ORAL at 09:43

## 2020-01-01 RX ADMIN — METOPROLOL TARTRATE 50 MG: 50 TABLET, FILM COATED ORAL at 20:35

## 2020-01-01 RX ADMIN — DEXAMETHASONE 6 MG: 4 TABLET ORAL at 08:16

## 2020-01-01 RX ADMIN — CALCIUM CITRATE 200 MG (950 MG) TABLET 950 MG: at 08:43

## 2020-01-01 RX ADMIN — SODIUM CHLORIDE, PRESERVATIVE FREE 10 ML: 5 INJECTION INTRAVENOUS at 08:40

## 2020-01-01 RX ADMIN — SODIUM BICARBONATE 650 MG: 650 TABLET ORAL at 15:19

## 2020-01-01 RX ADMIN — SODIUM BICARBONATE 650 MG: 650 TABLET ORAL at 08:39

## 2020-01-01 RX ADMIN — QUETIAPINE FUMARATE 25 MG: 25 TABLET ORAL at 22:40

## 2020-01-01 RX ADMIN — AMLODIPINE BESYLATE 5 MG: 5 TABLET ORAL at 20:27

## 2020-01-01 RX ADMIN — BACLOFEN 5 MG: 10 TABLET ORAL at 20:55

## 2020-01-01 RX ADMIN — DEXAMETHASONE SODIUM PHOSPHATE 6 MG: 4 INJECTION, SOLUTION INTRAMUSCULAR; INTRAVENOUS at 09:39

## 2020-01-01 RX ADMIN — SODIUM CHLORIDE, PRESERVATIVE FREE 10 ML: 5 INJECTION INTRAVENOUS at 08:44

## 2020-01-01 RX ADMIN — MULTIPLE VITAMINS W/ MINERALS TAB 1 TABLET: TAB at 08:39

## 2020-01-01 RX ADMIN — DEXAMETHASONE SODIUM PHOSPHATE 4 MG: 4 INJECTION, SOLUTION INTRAMUSCULAR; INTRAVENOUS at 09:11

## 2020-01-01 RX ADMIN — AMLODIPINE BESYLATE 5 MG: 5 TABLET ORAL at 09:43

## 2020-01-01 RX ADMIN — SODIUM CHLORIDE, PRESERVATIVE FREE 10 ML: 5 INJECTION INTRAVENOUS at 21:00

## 2020-01-01 RX ADMIN — SODIUM BICARBONATE 650 MG: 650 TABLET ORAL at 17:09

## 2020-01-01 RX ADMIN — SODIUM CHLORIDE, PRESERVATIVE FREE 10 ML: 5 INJECTION INTRAVENOUS at 09:42

## 2020-01-01 RX ADMIN — SODIUM CHLORIDE 250 ML: 9 INJECTION, SOLUTION INTRAVENOUS at 18:08

## 2020-01-01 RX ADMIN — PANTOPRAZOLE SODIUM 40 MG: 40 TABLET, DELAYED RELEASE ORAL at 09:00

## 2020-01-01 RX ADMIN — METOPROLOL TARTRATE 50 MG: 50 TABLET, FILM COATED ORAL at 12:32

## 2020-01-01 RX ADMIN — PANTOPRAZOLE SODIUM 40 MG: 40 TABLET, DELAYED RELEASE ORAL at 08:17

## 2020-01-01 RX ADMIN — INSULIN LISPRO 6 UNITS: 100 INJECTION, SOLUTION INTRAVENOUS; SUBCUTANEOUS at 08:00

## 2020-01-01 RX ADMIN — PANTOPRAZOLE SODIUM 40 MG: 40 TABLET, DELAYED RELEASE ORAL at 08:32

## 2020-01-01 RX ADMIN — INSULIN LISPRO 2 UNITS: 100 INJECTION, SOLUTION INTRAVENOUS; SUBCUTANEOUS at 21:50

## 2020-01-01 RX ADMIN — INSULIN LISPRO 6 UNITS: 100 INJECTION, SOLUTION INTRAVENOUS; SUBCUTANEOUS at 12:00

## 2020-01-01 RX ADMIN — TRAMADOL HYDROCHLORIDE 50 MG: 50 TABLET, FILM COATED ORAL at 20:31

## 2020-01-01 RX ADMIN — ENOXAPARIN SODIUM 30 MG: 30 INJECTION SUBCUTANEOUS at 20:32

## 2020-01-01 RX ADMIN — METOPROLOL TARTRATE 50 MG: 50 TABLET, FILM COATED ORAL at 20:47

## 2020-01-01 RX ADMIN — MULTIPLE VITAMINS W/ MINERALS TAB 1 TABLET: TAB at 09:36

## 2020-01-01 RX ADMIN — SODIUM CHLORIDE, PRESERVATIVE FREE 10 ML: 5 INJECTION INTRAVENOUS at 20:36

## 2020-01-01 RX ADMIN — DEXAMETHASONE 6 MG: 4 TABLET ORAL at 07:48

## 2020-01-01 RX ADMIN — SODIUM CHLORIDE, PRESERVATIVE FREE 10 ML: 5 INJECTION INTRAVENOUS at 09:00

## 2020-01-01 RX ADMIN — PANTOPRAZOLE SODIUM 40 MG: 40 TABLET, DELAYED RELEASE ORAL at 08:39

## 2020-01-01 RX ADMIN — METOPROLOL TARTRATE 50 MG: 50 TABLET, FILM COATED ORAL at 20:25

## 2020-01-01 RX ADMIN — HEPARIN SODIUM 5000 UNITS: 5000 INJECTION INTRAVENOUS; SUBCUTANEOUS at 14:21

## 2020-01-01 RX ADMIN — SODIUM BICARBONATE 650 MG: 650 TABLET ORAL at 20:29

## 2020-01-01 RX ADMIN — INSULIN LISPRO 6 UNITS: 100 INJECTION, SOLUTION INTRAVENOUS; SUBCUTANEOUS at 17:00

## 2020-01-01 RX ADMIN — MULTIPLE VITAMINS W/ MINERALS TAB 1 TABLET: TAB at 08:17

## 2020-01-01 RX ADMIN — SODIUM BICARBONATE 650 MG: 650 TABLET ORAL at 18:06

## 2020-01-01 RX ADMIN — SODIUM CHLORIDE, PRESERVATIVE FREE 10 ML: 5 INJECTION INTRAVENOUS at 08:34

## 2020-01-01 RX ADMIN — AMLODIPINE BESYLATE 5 MG: 5 TABLET ORAL at 07:49

## 2020-01-01 RX ADMIN — INSULIN LISPRO 2 UNITS: 100 INJECTION, SOLUTION INTRAVENOUS; SUBCUTANEOUS at 09:00

## 2020-01-01 RX ADMIN — SODIUM CHLORIDE 100 MG: 9 INJECTION, SOLUTION INTRAVENOUS at 17:04

## 2020-01-01 RX ADMIN — INSULIN LISPRO 3 UNITS: 100 INJECTION, SOLUTION INTRAVENOUS; SUBCUTANEOUS at 20:57

## 2020-01-01 RX ADMIN — METOPROLOL TARTRATE 50 MG: 50 TABLET, FILM COATED ORAL at 21:49

## 2020-01-01 RX ADMIN — INSULIN LISPRO 2 UNITS: 100 INJECTION, SOLUTION INTRAVENOUS; SUBCUTANEOUS at 17:10

## 2020-01-01 RX ADMIN — TRAMADOL HYDROCHLORIDE 50 MG: 50 TABLET, FILM COATED ORAL at 20:24

## 2020-01-01 RX ADMIN — ENOXAPARIN SODIUM 30 MG: 30 INJECTION SUBCUTANEOUS at 08:43

## 2020-01-01 RX ADMIN — SODIUM CHLORIDE, PRESERVATIVE FREE 10 ML: 5 INJECTION INTRAVENOUS at 20:24

## 2020-01-01 RX ADMIN — ENOXAPARIN SODIUM 30 MG: 30 INJECTION SUBCUTANEOUS at 08:40

## 2020-01-01 RX ADMIN — HEPARIN SODIUM 5000 UNITS: 5000 INJECTION INTRAVENOUS; SUBCUTANEOUS at 23:30

## 2020-01-01 RX ADMIN — SODIUM CHLORIDE, PRESERVATIVE FREE 10 ML: 5 INJECTION INTRAVENOUS at 09:49

## 2020-01-01 RX ADMIN — MULTIPLE VITAMINS W/ MINERALS TAB 1 TABLET: TAB at 07:48

## 2020-01-01 RX ADMIN — BACLOFEN 5 MG: 10 TABLET ORAL at 20:34

## 2020-01-01 RX ADMIN — HEPARIN SODIUM 5000 UNITS: 5000 INJECTION INTRAVENOUS; SUBCUTANEOUS at 06:20

## 2020-01-01 RX ADMIN — TRAMADOL HYDROCHLORIDE 50 MG: 50 TABLET, FILM COATED ORAL at 13:09

## 2020-01-01 RX ADMIN — QUETIAPINE FUMARATE 25 MG: 25 TABLET ORAL at 20:29

## 2020-01-01 RX ADMIN — ENOXAPARIN SODIUM 30 MG: 30 INJECTION SUBCUTANEOUS at 20:34

## 2020-01-01 RX ADMIN — BUMETANIDE 1 MG: 0.25 INJECTION INTRAMUSCULAR; INTRAVENOUS at 17:06

## 2020-01-01 RX ADMIN — SODIUM BICARBONATE 650 MG: 650 TABLET ORAL at 16:44

## 2020-01-01 RX ADMIN — INSULIN LISPRO 1 UNITS: 100 INJECTION, SOLUTION INTRAVENOUS; SUBCUTANEOUS at 20:28

## 2020-01-01 RX ADMIN — SODIUM CHLORIDE, PRESERVATIVE FREE 10 ML: 5 INJECTION INTRAVENOUS at 20:57

## 2020-01-01 RX ADMIN — INSULIN LISPRO 2 UNITS: 100 INJECTION, SOLUTION INTRAVENOUS; SUBCUTANEOUS at 17:42

## 2020-01-01 RX ADMIN — INSULIN LISPRO 4 UNITS: 100 INJECTION, SOLUTION INTRAVENOUS; SUBCUTANEOUS at 13:27

## 2020-01-01 RX ADMIN — MORPHINE SULFATE 2 MG: 2 INJECTION, SOLUTION INTRAMUSCULAR; INTRAVENOUS at 19:45

## 2020-01-01 RX ADMIN — METOPROLOL TARTRATE 50 MG: 50 TABLET, FILM COATED ORAL at 08:43

## 2020-01-01 RX ADMIN — AMLODIPINE BESYLATE 5 MG: 5 TABLET ORAL at 08:33

## 2020-01-01 RX ADMIN — PANTOPRAZOLE SODIUM 40 MG: 40 TABLET, DELAYED RELEASE ORAL at 08:43

## 2020-01-01 RX ADMIN — TRAMADOL HYDROCHLORIDE 50 MG: 50 TABLET, FILM COATED ORAL at 20:35

## 2020-01-01 RX ADMIN — QUETIAPINE FUMARATE 25 MG: 25 TABLET ORAL at 20:25

## 2020-01-01 RX ADMIN — QUETIAPINE FUMARATE 25 MG: 25 TABLET ORAL at 09:36

## 2020-01-01 RX ADMIN — HEPARIN SODIUM 5000 UNITS: 5000 INJECTION INTRAVENOUS; SUBCUTANEOUS at 05:50

## 2020-01-01 RX ADMIN — QUETIAPINE FUMARATE 25 MG: 25 TABLET ORAL at 08:46

## 2020-01-01 RX ADMIN — PANTOPRAZOLE SODIUM 40 MG: 40 TABLET, DELAYED RELEASE ORAL at 07:48

## 2020-01-01 RX ADMIN — DEXAMETHASONE 6 MG: 4 TABLET ORAL at 08:33

## 2020-01-01 RX ADMIN — INSULIN LISPRO 2 UNITS: 100 INJECTION, SOLUTION INTRAVENOUS; SUBCUTANEOUS at 08:19

## 2020-01-01 RX ADMIN — MULTIPLE VITAMINS W/ MINERALS TAB 1 TABLET: TAB at 08:43

## 2020-01-01 RX ADMIN — HALOPERIDOL LACTATE 5 MG: 5 INJECTION INTRAMUSCULAR at 04:19

## 2020-01-01 RX ADMIN — CALCIUM CITRATE 200 MG (950 MG) TABLET 950 MG: at 08:33

## 2020-01-01 RX ADMIN — CALCIUM CITRATE 200 MG (950 MG) TABLET 950 MG: at 09:32

## 2020-01-01 RX ADMIN — BUMETANIDE 1 MG: 1 TABLET ORAL at 17:06

## 2020-01-01 RX ADMIN — BUMETANIDE 1 MG: 0.25 INJECTION INTRAMUSCULAR; INTRAVENOUS at 09:18

## 2020-01-01 RX ADMIN — SODIUM CHLORIDE 200 MG: 9 INJECTION, SOLUTION INTRAVENOUS at 18:52

## 2020-01-01 RX ADMIN — METOPROLOL TARTRATE 50 MG: 50 TABLET, FILM COATED ORAL at 09:00

## 2020-01-01 RX ADMIN — METOPROLOL TARTRATE 50 MG: 50 TABLET, FILM COATED ORAL at 20:27

## 2020-01-01 RX ADMIN — TRAMADOL HYDROCHLORIDE 50 MG: 50 TABLET, FILM COATED ORAL at 09:42

## 2020-01-01 RX ADMIN — CALCIUM CITRATE 200 MG (950 MG) TABLET 950 MG: at 07:48

## 2020-01-01 RX ADMIN — SODIUM CHLORIDE 100 MG: 9 INJECTION, SOLUTION INTRAVENOUS at 18:38

## 2020-01-01 RX ADMIN — METOPROLOL TARTRATE 50 MG: 50 TABLET, FILM COATED ORAL at 09:36

## 2020-01-01 RX ADMIN — METOPROLOL TARTRATE 50 MG: 50 TABLET, FILM COATED ORAL at 08:17

## 2020-01-01 RX ADMIN — ENOXAPARIN SODIUM 30 MG: 30 INJECTION SUBCUTANEOUS at 07:49

## 2020-01-01 RX ADMIN — INSULIN LISPRO 6 UNITS: 100 INJECTION, SOLUTION INTRAVENOUS; SUBCUTANEOUS at 18:52

## 2020-01-01 RX ADMIN — HEPARIN SODIUM 5000 UNITS: 5000 INJECTION INTRAVENOUS; SUBCUTANEOUS at 21:50

## 2020-01-01 RX ADMIN — LORAZEPAM 0.5 MG: 2 INJECTION INTRAMUSCULAR at 00:57

## 2020-01-01 RX ADMIN — BUMETANIDE 1 MG: 1 TABLET ORAL at 08:43

## 2020-01-01 RX ADMIN — INSULIN GLARGINE 30 UNITS: 100 INJECTION, SOLUTION SUBCUTANEOUS at 22:50

## 2020-01-01 RX ADMIN — CALCIUM CITRATE 200 MG (950 MG) TABLET 950 MG: at 08:40

## 2020-01-01 RX ADMIN — METOPROLOL TARTRATE 50 MG: 50 TABLET, FILM COATED ORAL at 20:32

## 2020-01-01 RX ADMIN — ENOXAPARIN SODIUM 30 MG: 30 INJECTION SUBCUTANEOUS at 20:25

## 2020-01-01 RX ADMIN — INSULIN LISPRO 2 UNITS: 100 INJECTION, SOLUTION INTRAVENOUS; SUBCUTANEOUS at 23:15

## 2020-01-01 RX ADMIN — MULTIPLE VITAMINS W/ MINERALS TAB 1 TABLET: TAB at 08:33

## 2020-01-01 RX ADMIN — BACLOFEN 5 MG: 10 TABLET ORAL at 20:25

## 2020-01-01 RX ADMIN — LORAZEPAM 0.5 MG: 2 INJECTION INTRAMUSCULAR; INTRAVENOUS at 20:34

## 2020-01-01 RX ADMIN — INSULIN LISPRO 2 UNITS: 100 INJECTION, SOLUTION INTRAVENOUS; SUBCUTANEOUS at 18:06

## 2020-01-01 RX ADMIN — SODIUM CHLORIDE, PRESERVATIVE FREE 10 ML: 5 INJECTION INTRAVENOUS at 10:00

## 2020-01-01 RX ADMIN — SODIUM BICARBONATE 650 MG: 650 TABLET ORAL at 07:50

## 2020-01-01 RX ADMIN — ENOXAPARIN SODIUM 30 MG: 30 INJECTION SUBCUTANEOUS at 20:29

## 2020-01-01 RX ADMIN — INSULIN LISPRO 2 UNITS: 100 INJECTION, SOLUTION INTRAVENOUS; SUBCUTANEOUS at 12:10

## 2020-01-01 RX ADMIN — CALCIUM CITRATE 200 MG (950 MG) TABLET 950 MG: at 08:17

## 2020-01-01 RX ADMIN — METOPROLOL TARTRATE 50 MG: 50 TABLET, FILM COATED ORAL at 07:48

## 2020-01-01 RX ADMIN — SODIUM BICARBONATE 650 MG: 650 TABLET ORAL at 20:25

## 2020-01-01 RX ADMIN — TRAMADOL HYDROCHLORIDE 50 MG: 50 TABLET, FILM COATED ORAL at 03:49

## 2020-01-01 RX ADMIN — BACLOFEN 5 MG: 10 TABLET ORAL at 09:00

## 2020-01-01 RX ADMIN — BUMETANIDE 1 MG: 1 TABLET ORAL at 08:40

## 2020-01-01 RX ADMIN — INSULIN GLARGINE 30 UNITS: 100 INJECTION, SOLUTION SUBCUTANEOUS at 20:31

## 2020-01-01 RX ADMIN — INSULIN GLARGINE 30 UNITS: 100 INJECTION, SOLUTION SUBCUTANEOUS at 21:00

## 2020-01-01 RX ADMIN — METOPROLOL TARTRATE 50 MG: 50 TABLET, FILM COATED ORAL at 08:40

## 2020-01-01 RX ADMIN — METOPROLOL TARTRATE 50 MG: 50 TABLET, FILM COATED ORAL at 20:29

## 2020-01-01 RX ADMIN — SODIUM CHLORIDE 100 MG: 9 INJECTION, SOLUTION INTRAVENOUS at 17:43

## 2020-01-01 RX ADMIN — METOPROLOL TARTRATE 50 MG: 50 TABLET, FILM COATED ORAL at 08:33

## 2020-01-01 RX ADMIN — AMLODIPINE BESYLATE 5 MG: 5 TABLET ORAL at 08:46

## 2020-01-01 RX ADMIN — INSULIN LISPRO 2 UNITS: 100 INJECTION, SOLUTION INTRAVENOUS; SUBCUTANEOUS at 13:00

## 2020-01-01 RX ADMIN — ENOXAPARIN SODIUM 30 MG: 30 INJECTION SUBCUTANEOUS at 17:12

## 2020-01-01 RX ADMIN — SODIUM CHLORIDE 100 MG: 9 INJECTION, SOLUTION INTRAVENOUS at 17:25

## 2020-01-01 RX ADMIN — SODIUM BICARBONATE 650 MG: 650 TABLET ORAL at 20:33

## 2020-01-01 RX ADMIN — SODIUM CHLORIDE: 9 INJECTION, SOLUTION INTRAVENOUS at 23:04

## 2020-01-01 RX ADMIN — INSULIN LISPRO 2 UNITS: 100 INJECTION, SOLUTION INTRAVENOUS; SUBCUTANEOUS at 08:40

## 2020-01-01 RX ADMIN — DEXAMETHASONE 6 MG: 4 TABLET ORAL at 09:35

## 2020-01-01 RX ADMIN — BUMETANIDE 1 MG: 1 TABLET ORAL at 07:49

## 2020-01-01 RX ADMIN — SODIUM BICARBONATE 650 MG: 650 TABLET ORAL at 12:08

## 2020-01-01 RX ADMIN — TRAMADOL HYDROCHLORIDE 50 MG: 50 TABLET, FILM COATED ORAL at 11:58

## 2020-01-01 RX ADMIN — SODIUM CHLORIDE, PRESERVATIVE FREE 10 ML: 5 INJECTION INTRAVENOUS at 20:34

## 2020-01-01 RX ADMIN — INSULIN LISPRO 2 UNITS: 100 INJECTION, SOLUTION INTRAVENOUS; SUBCUTANEOUS at 21:00

## 2020-01-01 RX ADMIN — ENOXAPARIN SODIUM 30 MG: 30 INJECTION SUBCUTANEOUS at 20:56

## 2020-01-01 RX ADMIN — SODIUM BICARBONATE 650 MG: 650 TABLET ORAL at 09:36

## 2020-01-01 RX ADMIN — SODIUM CHLORIDE, PRESERVATIVE FREE 10 ML: 5 INJECTION INTRAVENOUS at 08:17

## 2020-01-01 RX ADMIN — BUMETANIDE 1 MG: 1 TABLET ORAL at 09:36

## 2020-01-01 RX ADMIN — MULTIPLE VITAMINS W/ MINERALS TAB 1 TABLET: TAB at 09:43

## 2020-01-01 RX ADMIN — INSULIN LISPRO 1 UNITS: 100 INJECTION, SOLUTION INTRAVENOUS; SUBCUTANEOUS at 21:51

## 2020-01-01 RX ADMIN — ENOXAPARIN SODIUM 30 MG: 30 INJECTION SUBCUTANEOUS at 08:32

## 2020-01-01 RX ADMIN — INSULIN GLARGINE 20 UNITS: 100 INJECTION, SOLUTION SUBCUTANEOUS at 21:51

## 2020-01-01 RX ADMIN — INSULIN LISPRO 2 UNITS: 100 INJECTION, SOLUTION INTRAVENOUS; SUBCUTANEOUS at 17:15

## 2020-01-01 RX ADMIN — CALCIUM CITRATE 200 MG (950 MG) TABLET 950 MG: at 09:43

## 2020-01-01 RX ADMIN — INSULIN LISPRO 4 UNITS: 100 INJECTION, SOLUTION INTRAVENOUS; SUBCUTANEOUS at 12:00

## 2020-01-01 RX ADMIN — MULTIPLE VITAMINS W/ MINERALS TAB 1 TABLET: TAB at 09:01

## 2020-01-01 RX ADMIN — ENOXAPARIN SODIUM 30 MG: 30 INJECTION SUBCUTANEOUS at 08:17

## 2020-01-01 RX ADMIN — INSULIN GLARGINE 30 UNITS: 100 INJECTION, SOLUTION SUBCUTANEOUS at 22:10

## 2020-01-01 RX ADMIN — INSULIN LISPRO 2 UNITS: 100 INJECTION, SOLUTION INTRAVENOUS; SUBCUTANEOUS at 20:40

## 2020-01-01 RX ADMIN — INSULIN LISPRO 2 UNITS: 100 INJECTION, SOLUTION INTRAVENOUS; SUBCUTANEOUS at 13:14

## 2020-01-01 RX ADMIN — AMLODIPINE BESYLATE 5 MG: 5 TABLET ORAL at 08:16

## 2020-01-01 RX ADMIN — INSULIN LISPRO 2 UNITS: 100 INJECTION, SOLUTION INTRAVENOUS; SUBCUTANEOUS at 08:00

## 2020-01-01 RX ADMIN — INSULIN GLARGINE 30 UNITS: 100 INJECTION, SOLUTION SUBCUTANEOUS at 20:27

## 2020-01-01 RX ADMIN — QUETIAPINE FUMARATE 25 MG: 25 TABLET ORAL at 08:39

## 2020-01-01 RX ADMIN — INSULIN LISPRO 2 UNITS: 100 INJECTION, SOLUTION INTRAVENOUS; SUBCUTANEOUS at 19:21

## 2020-01-01 RX ADMIN — ENOXAPARIN SODIUM 30 MG: 30 INJECTION SUBCUTANEOUS at 09:32

## 2020-01-01 RX ADMIN — INSULIN GLARGINE 30 UNITS: 100 INJECTION, SOLUTION SUBCUTANEOUS at 20:41

## 2020-01-01 RX ADMIN — AMLODIPINE BESYLATE 5 MG: 5 TABLET ORAL at 08:40

## 2020-01-01 RX ADMIN — SODIUM BICARBONATE 650 MG: 650 TABLET ORAL at 13:09

## 2020-01-01 RX ADMIN — METOPROLOL TARTRATE 50 MG: 50 TABLET, FILM COATED ORAL at 20:55

## 2020-01-01 RX ADMIN — SODIUM BICARBONATE 650 MG: 650 TABLET ORAL at 08:43

## 2020-01-01 SDOH — HEALTH STABILITY: MENTAL HEALTH: HOW OFTEN DO YOU HAVE A DRINK CONTAINING ALCOHOL?: MONTHLY OR LESS

## 2020-01-01 SDOH — HEALTH STABILITY: MENTAL HEALTH: HOW MANY STANDARD DRINKS CONTAINING ALCOHOL DO YOU HAVE ON A TYPICAL DAY?: 1 OR 2

## 2020-01-01 ASSESSMENT — PAIN DESCRIPTION - PAIN TYPE
TYPE: CHRONIC PAIN

## 2020-01-01 ASSESSMENT — PAIN SCALES - GENERAL
PAINLEVEL_OUTOF10: 4
PAINLEVEL_OUTOF10: 0
PAINLEVEL_OUTOF10: 0
PAINLEVEL_OUTOF10: 5
PAINLEVEL_OUTOF10: 0
PAINLEVEL_OUTOF10: 5
PAINLEVEL_OUTOF10: 0
PAINLEVEL_OUTOF10: 0
PAINLEVEL_OUTOF10: 3
PAINLEVEL_OUTOF10: 5
PAINLEVEL_OUTOF10: 0
PAINLEVEL_OUTOF10: 0
PAINLEVEL_OUTOF10: 4
PAINLEVEL_OUTOF10: 0

## 2020-01-01 ASSESSMENT — PAIN - FUNCTIONAL ASSESSMENT
PAIN_FUNCTIONAL_ASSESSMENT: PREVENTS OR INTERFERES SOME ACTIVE ACTIVITIES AND ADLS
PAIN_FUNCTIONAL_ASSESSMENT: PREVENTS OR INTERFERES SOME ACTIVE ACTIVITIES AND ADLS
PAIN_FUNCTIONAL_ASSESSMENT: ACTIVITIES ARE NOT PREVENTED
PAIN_FUNCTIONAL_ASSESSMENT: PREVENTS OR INTERFERES SOME ACTIVE ACTIVITIES AND ADLS

## 2020-01-01 ASSESSMENT — PAIN DESCRIPTION - FREQUENCY
FREQUENCY: INTERMITTENT

## 2020-01-01 ASSESSMENT — PAIN DESCRIPTION - ORIENTATION
ORIENTATION: LOWER

## 2020-01-01 ASSESSMENT — PAIN DESCRIPTION - DESCRIPTORS
DESCRIPTORS: DISCOMFORT

## 2020-01-01 ASSESSMENT — ENCOUNTER SYMPTOMS
PHOTOPHOBIA: 0
COLOR CHANGE: 1
BLOOD IN STOOL: 0
TROUBLE SWALLOWING: 0
VOMITING: 0
NAUSEA: 0
SORE THROAT: 0
SHORTNESS OF BREATH: 1
CHEST TIGHTNESS: 0
COUGH: 1

## 2020-01-01 ASSESSMENT — PAIN DESCRIPTION - ONSET
ONSET: ON-GOING

## 2020-01-01 ASSESSMENT — PAIN DESCRIPTION - LOCATION
LOCATION: BACK

## 2020-08-05 PROBLEM — J96.00 ACUTE RESPIRATORY FAILURE DUE TO COVID-19 (HCC): Status: ACTIVE | Noted: 2020-01-01

## 2020-08-05 PROBLEM — U07.1 ACUTE RESPIRATORY FAILURE DUE TO COVID-19 (HCC): Status: ACTIVE | Noted: 2020-01-01

## 2020-08-05 PROBLEM — J18.9 PNEUMONIA: Status: ACTIVE | Noted: 2020-01-01

## 2020-08-05 PROBLEM — Z85.46 HISTORY OF PROSTATE CANCER: Status: ACTIVE | Noted: 2017-05-24

## 2020-08-05 NOTE — CONSULTS
[facility] - Initial Consult Note COVID 19 Patient  Today's Date and Time: 8/5/2020, 3:42 PM    Impression :   · COVID 19 Suspect  · COVID 19 Confirmed Infection  · BABAK on CKD  · DM insulin dependent  · HTN  · Myelodysplastic Syndrome  · Chronic back pain    Recommendations:   · Agree with gentle hydration  · Stop Azithromycin  · Start Remdesivir 8/5-8/9  · Start Decadron 8/5-8/10  · The clinical research staff will investigate pt eligibility for COVID clinical trials  · Supportive care    Medical Decision Making/Summary/Discussion:8/5/2020     · Patient admitted with suspected COVID 19 infection  · Covid test confirmed positive. · Multiple co morbidities. Pt with hypoxia, requiring 15L NRB and BABAK  · [facility] for further care  · Plan to start Remdesivir 8/5-8/9  · Start Decadron 6mg IV daily 8/5-8/10  · Pt is clear that he is a DRN CCA - no intubation or chest compressions   Infection Control Recommendations   · Universal Precautions  · Airborne isolation  · Droplet Isolation    Antimicrobial Stewardship Recommendations     · Simplification of therapy  · Targeted therapy    Coordination of Outpatient Care:   · Estimated Length of IV antimicrobials:8/5-8/9  · Patient will need Midline Catheter Insertion: TBD  · Patient will need PICC line Insertion: No  · Patient will need: Home IV , [place],  SNF,  LTAC:TBD  · Patient will need outpatient wound care:No    Chief complaint/reason for consultation:   · Concern for COVID infection      History of Present Illness:   Ronit Torre is a 80y.o.-year-old  male who was initially admitted on 8/5/2020. Patient seen at the request of Dr. Norma Newby:    Patient is an 79 yo gentleman with a PMH of DM II - insulin dependent, HTN, CKD and a new diagnosis of myelodysplastic syndrome. He was hospitalized and [facility] from July 28-30 after falling at home.      Today (8-5) he was at his Hematologists office and reported that he was SOB. His SaO2 was in the low 80's and he was transported to the ED. Per the notes, a CXR showed patchy infiltrates, pt was tachycardic with SaO2 85% on RA. Labs showed  Cr 2.47  D-dimer 1.75  [id number]    AST 74  ALT 36    Viral PCR negative  COVID positive    Pt was transferred to [facility] for further care. He is AAO, reporting that he has been feeling very weak at home. No SOB, but nausea and diarrhea. Poor appetite, little PO intake. He states that he fell on 8-4 due to his weakness. Pt denies fevers or chills, no cough, no sputum production    He is clear that he does not wish to be intubated or have chest compressions. He does wish full treatment until either of those is necessary. Pt is AAO  Seen on 15L NRB with SaO2 100%, transitioned to 6L NC and desaturated to 80's  Tachycardic to 130's    Afebrile    Patient exhibiting respiratory distress Yes  Respiratory secretions:     Patient receiving supplemental oxygen. 15L NRB  02 sat  99%  RR 24    % FIO2:   PEEP:      QTc:     (Definition of High Risk Comorbid Conditions: Asthma, COPD, Heart Failure of any cause, DM, Hematologic malignancy, Immunocompromised taking >20 mg/day Prednisone). Baseline Number of High Risk Comorbid conditions:    5  Respiratory Support Level Score: (Room Air= 1 ;Supplemental 02 1L to 15 L/min= 2; Intubation and mechanical Ventilation = 3; Failure to support Ventilatory needs resulting in death =4)   2    NEWS Score: 0-4 Low risk group; 5-6: Medium risk group; 7 or above: High risk group  Parameters 3 2 1 0 1 2 3   Age    < 65   ? 65   RR ? 8  9-11 12-20  21-24 ? 25   O2 Sats ?  91 92-93 94-95 ? 96      Suppl O2  Yes  No      SBP ? 90  101-110 111-219   ? 220   HR ? 40  41-50 51-90  111-130 ? 131   Consciousness    Alert   Drowsiness, lethargy, or confusion   Temperature ? 35.0 C (95.0 F)  35.1-36.0 C 95.1-96.9 F 36.1-38.0 C 97.0-100.4 F 38.1-39.0 C 100.5-102.3 F ? 39.1 C ? 102.4 F      NEWS Score:   8-5-20: 12 high risk    Overall Daily Picture:    Worsening    Presence of secondary bacterial Infection:  No   Additional antibiotics: None     Labs, X rays reviewed: 8/5/2020    BUN: 76  Cr: 2.43    D dimer 1.75    WBC: 9.6  Hb: 11.3  Plat: 164    Absolute Neutrophils:  Absolute Lymphocytes:  Neutrophil/Lymphocyte Ratio:     CRP:  Ferritin:  LDH:     Pro Calcitonin:    Cultures:  Urine:  ·   Blood:  ·   Sputum :  ·   Wound:       CXR:     CAT:    Discussed with patient, RN, IM. I have personally reviewed the past medical history, past surgical history, medications, social history, and family history, and I have updated the database accordingly.   Past Medical History:     Past Medical History:   Diagnosis Date    Essential hypertension     Hyperlipidemia     Insulin-requiring or dependent type II diabetes mellitus ([facility] [address]        Past Surgical  History:     Past Surgical History:   Procedure Laterality Date    APPENDECTOMY      COLECTOMY      LUMBAR LAMINECTOMY  2008    OTHER SURGICAL HISTORY  02/05/2020    Right shoulder joint injection and right L4/L5 TFESI    OTHER SURGICAL HISTORY Right 10/09/2019    Right L4 and 5 TFESI    PARATHYROIDECTOMY         Medications:      sodium chloride flush  10 mL Intravenous 2 times per day    insulin lispro  0-12 Units Subcutaneous TID WC    insulin lispro  0-6 Units Subcutaneous Nightly    [START ON 8/6/2020] dexamethasone  6 mg Oral Daily    heparin (porcine)  5,000 Units Subcutaneous 3 times per day    [START ON 8/6/2020] azithromycin  500 mg Intravenous Q24H    amLODIPine  5 mg Oral Daily    metoprolol tartrate  50 mg Oral BID    calcium citrate  950 mg Oral Daily    therapeutic multivitamin-minerals  1 tablet Oral Daily    [START ON 8/6/2020] pantoprazole  40 mg Oral QAM AC    sodium chloride  250 mL Intravenous Once       Social History:     Social History     Socioeconomic History    Marital status:      Spouse name: Not on file    Number of children: Not on file    Years of education: Not on file    Highest education level: Not on file   Occupational History    Not on file   Social Needs    Financial resource strain: Not on file    Food insecurity     Worry: Not on file     Inability: Not on file    Transportation needs     Medical: Not on file     Non-medical: Not on file   Tobacco Use    Smoking status: Former Smoker     Types: Cigarettes     Start date: 1967     Last attempt to quit: 2017     Years since quitting: 3.5   Substance and Sexual Activity    Alcohol use: Yes     Alcohol/week: 1.0 standard drinks     Types: 1 Glasses of wine per week     Frequency: Monthly or less     Drinks per session: 1 or 2    Drug use: Not on file    Sexual activity: Not on file   Lifestyle    Physical activity     Days per week: Not on file     Minutes per session: Not on file    Stress: Not on file   Relationships    Social connections     Talks on phone: Not on file     Gets together: Not on file     Attends Tenriism service: Not on file     Active member of club or organization: Not on file     Attends meetings of clubs or organizations: Not on file     Relationship status: Not on file    Intimate partner violence     Fear of current or ex partner: Not on file     Emotionally abused: Not on file     Physically abused: Not on file     Forced sexual activity: Not on file   Other Topics Concern    Not on file   Social History Narrative    Not on file       Family History:     Family History   Problem Relation Age of Onset    Breast Cancer Father     Prostate Cancer Father     Prostate Cancer Brother         Allergies:   Lovaza [omega-3-acid ethyl esters] and Metformin and related     Review of Systems:     Constitutional: No fevers or chills. Generalized malaise, weakness and fatigue  Head: No headaches  Eyes: No double vision or blurry vision. No conjunctival inflammation.   ENT: No sore throat or runny nose. . No hearing loss, tinnitus or vertigo. Cardiovascular: No chest pain or palpitations. No shortness of breath. No SANTIAGO  Lung: No shortness of breath or cough. No sputum production  Abdomen:  nausea, diarrhea, no vomiting or abdominal pain. Bell Staggers No cramps. Genitourinary: No increased urinary frequency, or dysuria. No hematuria. No suprapubic or CVA pain  Musculoskeletal: Chronic back pain  Hematologic: Rt toes with ecchymosis. Pt able to wriggle all toes without pain  Neurologic: No headache, weakness, numbness, or tingling. Integument: No rash, no ulcers. Psychiatric: No depression. Endocrine: No polyuria, no polydipsia, no polyphagia. Physical Examination :     Patient Vitals for the past 8 hrs:   BP Temp Temp src Pulse Resp SpO2   08/05/20 1500 131/76 97.7 °F (36.5 °C) Axillary 127 24 100 %     General Appearance: Awake, alert, and in distress  Head:  Normocephalic, no trauma  Eyes: Pupils equal, round, reactive to light and accommodation; extraocular movements intact; sclera anicteric; conjunctivae pink. No embolic phenomena. ENT: Oropharynx clear, without erythema, exudate, or thrush. No tenderness of sinuses. Mouth/throat: mucosa pink and moist. No lesions. Dentition in good repair. Neck:Supple, without lymphadenopathy. Thyroid normal, No bruits. Pulmonary/Chest: Clear to auscultation, generally diminished throughout without wheezes, rales, or rhonchi. No dullness to percussion. Cardiovascular: Regular tachcardic rate and rhythm   Abdomen: Soft, non tender. Bowel sounds normal. No organomegaly  All four Extremities: Bilateral mild edema  Neurologic: No gross sensory or motor deficits. Skin: Warm and dry with good turgor. signs of peripheral arterial  insufficiency. No ulcerations. No open wounds.     Medical Decision Making -Laboratory:   I have independently reviewed/ordered the following labs:    CBC with Differential: No results for input(s): WBC, HGB, HCT, PLT, SEGSPCT,

## 2020-08-05 NOTE — H&P
[facility]    HISTORY AND PHYSICAL EXAMINATION            Date:   8/5/2020  Patient name:  Hayden Garcia  Date of admission:  8/5/2020  2:22 PM  MRN:   [id number]  Account:  [de-identified]  YOB: 1932  PCP:    Norma Zheng  Room:   3001/3001-01  Code Status:    DNR-CCA    Chief Complaint:     Shortness of breath    History Obtained From:     patient, electronic medical record    History of Present Illness:     Hayden Garcia is a 80 y.o. male who presents with shortness of breath. He was directly admitted from [facility] ED Aug 5 for the management of Acute respiratory failure due to COVID-19. The patient was admitted to [facility] last week after having a syncopal event. He believes this was attributed to his b/p being low. During that stay he was diagnosed with MDS. Today the pt was being evaluated by hematology when he reported worsening shortness of breath. Oxygen sats were noted to be in low 80's. Pt was sent to ED where he tested + for covid. CXR showed patchy infiltrates. Pt was started on 4L/nc but required non rebreather prior to transfer. He also received Decadron, Zithromax and Duoneb. The hospitalist service did not feel comfortable admitting him at [facility]. He is a DNRCC-A and does not want CPR or intubation. 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      Pertinent Diagnostics on Admit:  WBC 9.6, hgb 11.3, platelets 102, lymphocytes 8, Glucose 179, BUN/Creat 76/2.43, ALT 36, AST 74, ALP 76, T bili 1.5, Troponin 17, D dimer 1.75, INR 1.16,  (0-100)    ABG's:  7.44 / 48 / 21 / 14.2 / -8 on 36% O2    Resp Viral panel:  + Covid 19    CXR:  Faint small scattered hazy opacities bi-basilar    Past Medical History:     Past Medical History:   Diagnosis Date    Aortic stenosis     Essential hypertension     Hyperlipidemia     Insulin-requiring or dependent type II diabetes mellitus ([facility] [address]     MDS (myelodysplastic syndrome) ([facility] [address]         Past Surgical History:     Past Surgical History:   Procedure Laterality Date    APPENDECTOMY      COLECTOMY      LUMBAR LAMINECTOMY  2008    OTHER SURGICAL HISTORY  02/05/2020    Right shoulder joint injection and right L4/L5 TFESI    OTHER SURGICAL HISTORY Right 10/09/2019    Right L4 and 5 TFESI    PARATHYROIDECTOMY          Medications Prior to Admission:     Prior to Admission medications    Medication Sig Start Date End Date Taking? Authorizing Provider   traMADol (ULTRAM) 50 MG tablet Take 50 mg by mouth 2 times daily as needed for Pain.    Yes Historical Provider, MD   Multiple Vitamins-Minerals (CENTRUM SILVER) TABS Take 1 tablet by mouth daily   Yes Historical Provider, MD   Coenzyme Q10 (COQ10 PO) Take 1 tablet by mouth daily   Yes Historical Provider, MD   Omega-3-acid Ethyl Esters (LOVAZA PO) Take by mouth   Yes Historical Provider, MD   omeprazole (PRILOSEC) 20 MG delayed release capsule Take 20 mg by mouth daily   Yes Historical Provider, MD   Insulin Degludec (TRESIBA) 100 UNIT/ML SOLN Inject 0.8 Units into the skin Daily with supper   Yes Historical Provider, MD   acetaminophen (TYLENOL) 325 MG tablet Take 325 mg by mouth every 6 hours as needed for Pain   Yes Historical Provider, MD   Cholecalciferol (VITAMIN D3) 250 MCG (66714 UT) CAPS Take 1 capsule by mouth daily   Yes Historical Provider, MD   Phytonadione (VITAMIN K1) POWD 500 mg by Does not apply route daily   Yes Historical Provider, MD   amLODIPine (NORVASC) 5 MG tablet Take 5 mg by mouth daily   Yes Historical Provider, MD   bisoprolol (ZEBETA) 10 MG tablet Take 20 mg by mouth daily   Yes Historical Provider, MD   calcium citrate (CALCITRATE) 950 MG tablet Take 1 tablet by mouth daily   Yes Historical Provider, MD   chlorthalidone (HYGROTON) 25 MG tablet Take 25 mg by mouth daily   Yes Historical Provider, MD        Allergies:     Lovaza [omega-3-acid ethyl esters] and Metformin and related    Social History:     Tobacco:    reports that he quit smoking about 3 years ago. His smoking use included cigarettes. He started smoking about 53 years ago. He does not have any smokeless tobacco history on file. Alcohol:      reports current alcohol use of about 1.0 standard drinks of alcohol per week. Drug Use:  has no history on file for drug. Family History:     Family History   Problem Relation Age of Onset    Breast Cancer Father     Prostate Cancer Father     Prostate Cancer Brother        Review of Systems:     Positive and Negative as described in HPI. Review of Systems   Constitutional: Negative for activity change, chills, fatigue and fever. HENT: Negative for sore throat and trouble swallowing. Eyes: Negative for photophobia and visual disturbance. Respiratory: Positive for cough (non productive) and shortness of breath. Negative for chest tightness. Cardiovascular: Positive for leg swelling (chronic swelling rt lower leg and ankle due to hx leg fracture). Negative for chest pain. Gastrointestinal: Negative for blood in stool, nausea and vomiting. Genitourinary: Negative for dysuria and hematuria. Musculoskeletal: Negative for joint swelling and myalgias. Skin: Positive for color change (bruises rt foot from fall last week). Neurological: Positive for syncope (last week). Negative for dizziness, light-headedness and headaches. All other systems reviewed and are negative. Physical Exam:   /76   Pulse 126   Temp 97.7 °F (36.5 °C) (Axillary)   Resp 24   SpO2 (!) 88%   Temp (24hrs), Av.7 °F (36.5 °C), Min:97.7 °F (36.5 °C), Max:97.7 °F (36.5 °C)    No results for input(s): POCGLU in the last 72 hours. Intake/Output Summary (Last 24 hours) at 2020 1924  Last data filed at 2020 1859  Gross per 24 hour   Intake 400 ml   Output --   Net 400 ml       Physical Exam  Vitals signs and nursing note reviewed.    Constitutional:       General: He is not in acute distress. Appearance: Normal appearance. He is not ill-appearing or toxic-appearing. HENT:      Head: Normocephalic. Nose: No congestion. Mouth/Throat:      Mouth: Mucous membranes are dry. Pharynx: Oropharynx is clear. Eyes:      General: No scleral icterus. Pupils: Pupils are equal, round, and reactive to light. Neck:      Musculoskeletal: Neck supple. Cardiovascular:      Rate and Rhythm: Normal rate and regular rhythm. Heart sounds: Murmur (holosystolic) present. Pulmonary:      Effort: Pulmonary effort is normal. No respiratory distress. Breath sounds: Normal breath sounds. Abdominal:      General: Bowel sounds are normal.      Palpations: Abdomen is soft. Tenderness: There is no abdominal tenderness. Musculoskeletal:         General: Swelling (chronic swelling rt lower leg due to hx fracture) present. Lymphadenopathy:      Cervical: No cervical adenopathy. Skin:     General: Skin is warm and dry. Capillary Refill: Capillary refill takes less than 2 seconds. Findings: Bruising (rt toes) present. Neurological:      General: No focal deficit present. Mental Status: He is alert and oriented to person, place, and time. Cranial Nerves: No cranial nerve deficit. Psychiatric:         Mood and Affect: Mood normal.         Investigations:      Laboratory Testing:  Recent Results (from the past 24 hour(s))   C-REACTIVE PROTEIN    Collection Time: 08/05/20  6:40 PM   Result Value Ref Range    .9 (H) 0.0 - 5.0 mg/L   Lactate Dehydrogenase    Collection Time: 08/05/20  6:40 PM   Result Value Ref Range     (H) 135 - 225 U/L   Procalcitonin    Collection Time: 08/05/20  6:40 PM   Result Value Ref Range    Procalcitonin 0.32 (H) <0.09 ng/mL       Imaging/Diagnostics:  No results found.     Assessment :      Hospital Problems           Last Modified POA    * (Principal) Acute respiratory failure due to COVID-19 8/5/2020 Yes    Pneumonia 8/5/2020 Yes    MDS (myelodysplastic syndrome) ([facility] [place] [address] 8/5/2020 Yes    Insulin-requiring or dependent type II diabetes mellitus ([facility] [place] [address] 8/5/2020 Yes    Hyperlipidemia 8/5/2020 Yes    Essential hypertension 8/5/2020 Yes    Aortic stenosis 8/5/2020 Yes          Plan:     Patient status inpatient in the Progressive Unit/Step down    1. Consult ID  2. Protonix for GI prophylaxis  3. Heparin for DVT proph  4. CBC, CMP, D dimer, fibrinogen daily  5. PT/OT eval and treat  6. Oxygen to keep sats >90%  7. Verified pt is DNRCC-A, no CPR or intubation    Consultations:   IP CONSULT TO INFECTIOUS DISEASES    Patient is admitted as inpatient status because of co-morbidities listed above, severity of signs and symptoms as outlined, requirement for current medical therapies and most importantly because of direct risk to patient if care not provided in a hospital setting. Expected length of stay > 48 hours.     SHADIA Ashraf - CNS  8/5/2020  7:24 PM    Copy sent to Dr. Van Madrid

## 2020-08-05 NOTE — PROGRESS NOTES
Writer attempted to verify medications that patient was currently taking. The [facility] has only filled tramadol. Spoke with daughter and she said that he goes to the [facility] in [place] and they may know what other medications he is on. Writer attempted to call the [facility] in [place], they closed at 4:30. Will attempt to contact them tomorrow.

## 2020-08-06 PROBLEM — J96.01 ACUTE RESPIRATORY FAILURE WITH HYPOXIA (HCC): Status: ACTIVE | Noted: 2020-01-01

## 2020-08-06 PROBLEM — J12.82 PNEUMONIA DUE TO COVID-19 VIRUS: Status: ACTIVE | Noted: 2020-01-01

## 2020-08-06 PROBLEM — U07.1 PNEUMONIA DUE TO COVID-19 VIRUS: Status: ACTIVE | Noted: 2020-01-01

## 2020-08-06 NOTE — PROGRESS NOTES
Pt. HR up to 140's then back down into 80's Trigeminy, pt. Resting in bed no distress noted. Will monitor.

## 2020-08-06 NOTE — PROGRESS NOTES
Sindhu Real [address]    Progress Note    8/6/2020    2:05 PM    Name:   Clayton Longoria  MRN:     [id number]     Kimrosilyside:      [de-identified]   Room:   [facility] [place] [address] Day:  1  Admit Date:  8/5/2020  2:22 PM    PCP:   Savana Florez  Code Status:  DNR-CCA    Subjective:     C/C: Shortness of breath    Interval History Status: improved. Patient seen and examined this morning. Admitted overnight secondary to shortness of breath as noted in his oncology appointment on 8/5. Recently discharged from [facility] for syncopal episode. He was treated for pneumonia at that time. From the oncologist office, patient was transferred to the emergency department where he was found to be hypoxic. COVID was tested and was positive. Patient is a DNR CCA and does not want intubation. Throughout the evening, he was on high flow nasal cannula and nonrebreather mask. This morning, he is sitting up in chair at bedside with a nonrebreather mask on. He notes that his appetite is fair. He says his breathing is somewhat labored. However, he believes it is better than it was yesterday. Infectious disease was started Remdesivir and he is receiving convalescent plasma today. Brief History:     Per my associate: At 5 together grossly the second is wondering if there are some notes like you get interrupted with talking pages about this patient's ongoing Marine Garcia is a 80 y.o. male who presents with shortness of breath. He was directly admitted from [facility] ED Aug 5 for the management of Acute respiratory failure due to COVID-19.     The patient was admitted to [facility] last week after having a syncopal event. He believes this was attributed to his b/p being low. During that stay he was diagnosed with MDS. Today the pt was being evaluated by hematology when he reported worsening shortness of breath. Oxygen sats were noted to be in low 80's.   Pt was sent to ED where he tested + for covid. CXR showed patchy infiltrates. Pt was started on 4L/nc but required non rebreather prior to transfer. He also received Decadron, Zithromax and Duoneb.       The hospitalist service did not feel comfortable admitting him at [facility]. He is a DNRCC-A and does not want CPR or intubation. 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      Pertinent Diagnostics on Admit:  WBC 9.6, hgb 11.3, platelets 971, lymphocytes 8, Glucose 179, BUN/Creat 76/2.43, ALT 36, AST 74, ALP 76, T bili 1.5, Troponin 17, D dimer 1.75, INR 1.16,  (0-100)     ABG's:  7.44 / 48 / 21 / 14.2 / -8 on 36% O2     Resp Viral panel:  + Covid 19     CXR:  Faint small scattered hazy opacities bi-basilar    Review of Systems:     Constitutional:  negative for chills, fevers, sweats  Respiratory: Reports shortness of breath; reports cough; negative for wheezing  Cardiovascular:  negative for chest pain, chest pressure/discomfort, lower extremity edema, palpitations  Gastrointestinal:  negative for abdominal pain, constipation, diarrhea, nausea, vomiting  Neurological:  negative for dizziness, headache  MSK: reports neck pain/neck stiffness    Medications: Allergies:     Allergies   Allergen Reactions    Lovaza [Omega-3-Acid Ethyl Esters] Anaphylaxis    Metformin And Related Anaphylaxis       Current Meds:   Scheduled Meds:    sodium chloride  20 mL Intravenous Once    sodium chloride flush  10 mL Intravenous 2 times per day    insulin lispro  0-12 Units Subcutaneous TID WC    insulin lispro  0-6 Units Subcutaneous Nightly    heparin (porcine)  5,000 Units Subcutaneous 3 times per day    amLODIPine  5 mg Oral Daily    metoprolol tartrate  50 mg Oral BID    calcium citrate  950 mg Oral Daily    therapeutic multivitamin-minerals  1 tablet Oral Daily    pantoprazole  40 mg Oral QAM AC    dexamethasone  6 mg Intravenous Daily    remdesivir IVPB  100 mg Intravenous Q24H     Continuous Infusions:    sodium chloride 75 mL/hr at 20 2304     PRN Meds: sodium chloride flush, potassium chloride **OR** potassium alternative oral replacement **OR** potassium chloride, magnesium sulfate, acetaminophen **OR** acetaminophen, polyethylene glycol, promethazine **OR** ondansetron, acetaminophen, traMADol, sodium chloride    Data:     Past Medical History:   has a past medical history of Aortic stenosis, Essential hypertension, Hyperlipidemia, Insulin-requiring or dependent type II diabetes mellitus ([facility] MDS (myelodysplastic syndrome) ([facility] and Patient in clinical research study. Social History:   reports that he quit smoking about 3 years ago. His smoking use included cigarettes. He started smoking about 53 years ago. He does not have any smokeless tobacco history on file. He reports current alcohol use of about 1.0 standard drinks of alcohol per week. Family History:   Family History   Problem Relation Age of Onset    Breast Cancer Father     Prostate Cancer Father     Prostate Cancer Brother        Vitals:  BP (!) 135/55   Pulse 66   Temp 97.6 °F (36.4 °C) (Axillary)   Resp 23   Ht 5' 8\" (1.727 m)   Wt 160 lb 0.9 oz (72.6 kg)   SpO2 93%   BMI 24.34 kg/m²   Temp (24hrs), Av.5 °F (36.4 °C), Min:96.6 °F (35.9 °C), Max:98.1 °F (36.7 °C)    Recent Labs     20  0008 20  0321 20  0922 20  1226   POCGLU 269* 160* 150* 295*       I/O (24Hr):     Intake/Output Summary (Last 24 hours) at 2020 1405  Last data filed at 2020 0935  Gross per 24 hour   Intake 2943 ml   Output --   Net 2943 ml       Labs:  Hematology:  Recent Labs     20  1840 20  0358   WBC  --  6.5   RBC  --  3.22*   HGB  --  9.9*   HCT  --  30.7*   MCV  --  95.3   MCH  --  30.7   MCHC  --  32.2   RDW  --  13.9   PLT  --  111*   MPV  --  10.9   .9*  --    INR  --  1.1   DDIMER  --  1.46     Chemistry:  Recent Labs     20  1840 20  0657    138   K 4.6 4.7   CL 106 109*   CO2 13* 16*   GLUCOSE 278* 215*   BUN 63* 59*   CREATININE 1.65* 1.22*   MG  --  2.4   ANIONGAP 18* 13   LABGLOM 40* 56*   GFRAA 48* >60   CALCIUM 8.0* 8.4*     Recent Labs     08/05/20  1813 08/05/20  1840 08/05/20  2258 08/06/20  0008 08/06/20  0321 08/06/20  0657 08/06/20  0922 08/06/20  1226   PROT  --  5.8*  --   --   --  6.1*  --   --    LABALBU  --  3.2*  --   --   --  3.2*  --   --    AST  --  66*  --   --   --  63*  --   --    ALT  --  29  --   --   --  34  --   --    LDH  --  803*  --   --   --   --   --   --    ALKPHOS  --  76  --   --   --  91  --   --    BILITOT  --  0.96  --   --   --  1.06  --   --    POCGLU 251*  --  242* 269* 160*  --  150* 295*     ABG:No results found for: POCPH, PHART, PH, POCPCO2, WWR1LCR, PCO2, POCPO2, PO2ART, PO2, POCHCO3, TCE4DPS, HCO3, NBEA, PBEA, BEART, BE, THGBART, THB, FEB1UHI, WWHF1DAG, Q5ZGYRMW, O2SAT, FIO2  No results found for: SPECIAL  No results found for: CULTURE    Radiology:  No results found.     Physical Examination:        General appearance:  alert, cooperative and no distress, elderly  gentleman sitting up in chair at bedside  Mental Status:  oriented to person, place and time and normal affect  Lungs:  clear to auscultation bilaterally, no wheezing, diminished lung sounds bibasilarly normal effort  Heart:  regular rate and rhythm, no murmur  Abdomen:  soft, nontender, nondistended, normal bowel sounds, no masses, hepatomegaly, splenomegaly  Extremities:  no edema, redness, tenderness in the calves  Skin:  no gross lesions, rashes, induration    Assessment:        Hospital Problems           Last Modified POA    * (Principal) Acute respiratory failure due to COVID-19 8/6/2020 Yes    Acute respiratory failure with hypoxia ([facility] 8/6/2020 Yes    Pneumonia due to COVID-19 virus 8/6/2020 Yes    MDS (myelodysplastic syndrome) ([facility] 8/6/2020 Yes    Insulin-requiring or dependent type II diabetes mellitus ([facility] 8/6/2020 Yes    Hyperlipidemia 8/6/2020 Yes    Essential hypertension 8/6/2020 Yes    Aortic stenosis 8/5/2020 Yes          Plan:        1. Appreciate infectious disease input. 2. Patient to be started on Remdesivir and convalescent plasma. 3. COVID-19 positive, maintain droplet plus precautions, maintain SPO2 greater than 90%. 4. Appreciate nursing staff for reaching out to patient's pharmacy and PCP to figure out home medications  5. Baclofen x1 for muscle spasm  6. Renal function continues to improve daily with IV fluids. Patient was taking his home medications which include chlorthalidone and Bumex at home. He notes that he has not been eating and drinking much. He also notes that his blood pressure medications were just increased at [facility] at discharge earlier this month. 7. Monitor blood glucoses. Maintain insulin sliding scale for coverage  8. Continue to hold lisinopril in setting of BBAAK. Monitor blood pressure  9. Continue metoprolol which is a substitute with the pharmacy for bisoprolol  10. Continue amlodipine  11. Start Lantus 20 units nightly  12. Continue daily labs  13. DVT ppx. GI ppx  14. Will update family today.       33 Patti Petersen DO  8/6/2020  2:05 PM

## 2020-08-06 NOTE — RESEARCH
Clinical Research Services      Patient Name: Pandora Hodgkin  MRN: [id number]  YOB: 1932  Date of evaluation: 8/6/2020  Time of evaluation: 12:51  Reason for evaluation:  Screening    Asked by Dr. Macarena Lee to evaluate the patient on 8/5/2020 for Expanded Access to [address] for the Treatment of Patients with COVID-19  Protocol # [id number]     IND# [id number]       NCT# [id number]               :  Rona Corley MD through the [facility]    [x]  Patient met eligibility criteria  [x]  On 8/5/2020 Jil Pastrana NP discussed the study with the subject. He was agreeable to read the consents and consider enrollment. On 8/5/2020 at 17:15, I took the copies of the consents to the [address] [place] [address] room. I tried to reach the subject by phone, but he was unable to hear me at that time. The RN was to give them to the patient when she goes into the room, next. My cell phone number was left for questions. On 8/6/2020, the RN said that the subject had questions related to the study. I called the patient on his cell phone. I explained briefly the protocol to him. He stated that he had read the consent and he does not have any additional questions. The RN will assist the patient in signing the consent the next time she goes into his room and call me. Dr. Macarena Lee was update. He requested that the subject receive 1 unit of convalescent plasma. The Consents were signed by the patient @ 12:30. [x]  A copy of the signed consents given to the patient   [x]  Blood bank notified  [x]  Patient educated on plasma indication and side effects. [x]  Patient verbalizes understanding. [x]  The patient will receive 1 unit of ABO compatible COVID-19 convalescent plasma over 1-2 hours.           Haley Sanchez RN      Clinical Research Nurse  For questions page or perfect serve Dr. Macarena Lee, Dr. Soledad Polk, Dr. Vicky Cowden, Dr. Lo Cox , Dr. Martin Chu, or Dr. Teofilo Ayala or the research nurse at [phone]    Lashonda Sadler MD

## 2020-08-06 NOTE — PROGRESS NOTES
Alert   Drowsiness, lethargy, or confusion   Temperature ? 35.0 C (95.0 F)  35.1-36.0 C 95.1-96.9 F 36.1-38.0 C 97.0-100.4 F 38.1-39.0 C 100.5-102.3 F ? 39.1 C ? 102.4 F      NEWS Score:   8-5-20: 12 high risk    Overall Daily Picture:    Mild improvement    Presence of secondary bacterial Infection:  No   Additional antibiotics: None     Labs, X rays reviewed: 8/6/2020    BUN: 76-->63  Cr: 2.43-->1.65    D dimer 1.75    WBC: 9.6-->6.5  Hb: 11.3-->9.9  Plat: 164-->111    Absolute Neutrophils:5.59  Absolute Lymphocytes:0.52  Neutrophil/Lymphocyte Ratio: 10.7 high risk    CRP: 140.9  Ferritin: 7,343  LDH: 803    Pro Calcitonin:0.32    Cultures:  Urine:  ·   Blood:  ·   Sputum :  ·   Wound:       CXR:     CAT:    Discussed with patient, RN, IM. I have personally reviewed the past medical history, past surgical history, medications, social history, and family history, and I have updated the database accordingly.   Past Medical History:     Past Medical History:   Diagnosis Date    Aortic stenosis     Essential hypertension     Hyperlipidemia     Insulin-requiring or dependent type II diabetes mellitus ([facility]     MDS (myelodysplastic syndrome) ([facility])        Past Surgical  History:     Past Surgical History:   Procedure Laterality Date    APPENDECTOMY      COLECTOMY      LUMBAR LAMINECTOMY  2008    OTHER SURGICAL HISTORY  02/05/2020    Right shoulder joint injection and right L4/L5 TFESI    OTHER SURGICAL HISTORY Right 10/09/2019    Right L4 and 5 TFESI    PARATHYROIDECTOMY         Medications:      sodium chloride flush  10 mL Intravenous 2 times per day    insulin lispro  0-12 Units Subcutaneous TID WC    insulin lispro  0-6 Units Subcutaneous Nightly    heparin (porcine)  5,000 Units Subcutaneous 3 times per day    amLODIPine  5 mg Oral Daily    metoprolol tartrate  50 mg Oral BID    calcium citrate  950 mg Oral Daily    therapeutic multivitamin-minerals  1 tablet Oral Daily    pantoprazole  40 mg Oral QAM AC    dexamethasone  6 mg Intravenous Daily    remdesivir IVPB  100 mg Intravenous Q24H       Social History:     Social History     Socioeconomic History    Marital status:      Spouse name: Not on file    Number of children: Not on file    Years of education: Not on file    Highest education level: Not on file   Occupational History    Not on file   Social Needs    Financial resource strain: Not on file    Food insecurity     Worry: Not on file     Inability: Not on file    Transportation needs     Medical: Not on file     Non-medical: Not on file   Tobacco Use    Smoking status: Former Smoker     Types: Cigarettes     Start date: 1967     Last attempt to quit: 2017     Years since quitting: 3.5   Substance and Sexual Activity    Alcohol use:  Yes     Alcohol/week: 1.0 standard drinks     Types: 1 Glasses of wine per week     Frequency: Monthly or less     Drinks per session: 1 or 2    Drug use: Not on file    Sexual activity: Not on file   Lifestyle    Physical activity     Days per week: Not on file     Minutes per session: Not on file    Stress: Not on file   Relationships    Social connections     Talks on phone: Not on file     Gets together: Not on file     Attends Gnosticist service: Not on file     Active member of club or organization: Not on file     Attends meetings of clubs or organizations: Not on file     Relationship status: Not on file    Intimate partner violence     Fear of current or ex partner: Not on file     Emotionally abused: Not on file     Physically abused: Not on file     Forced sexual activity: Not on file   Other Topics Concern    Not on file   Social History Narrative    Not on file       Family History:     Family History   Problem Relation Age of Onset    Breast Cancer Father     Prostate Cancer Father     Prostate Cancer Brother         Allergies:   Lovaza [omega-3-acid ethyl esters] and Metformin and related     Review of Systems: Constitutional: No fevers or chills. Generalized malaise, weakness and fatigue  Head: No headaches  Eyes: No double vision or blurry vision. No conjunctival inflammation. ENT: No sore throat or runny nose. . No hearing loss, tinnitus or vertigo. Cardiovascular: No chest pain or palpitations. No shortness of breath. No SANTIAGO  Lung: No shortness of breath or cough. No sputum production  Abdomen:  nausea, diarrhea, no vomiting or abdominal pain. Tonye Cogan No cramps. Genitourinary: No increased urinary frequency, or dysuria. No hematuria. No suprapubic or CVA pain  Musculoskeletal: Chronic back pain  Hematologic: Rt toes with ecchymosis. Pt able to wriggle all toes without pain  Neurologic: No headache, weakness, numbness, or tingling. Integument: No rash, no ulcers. Psychiatric: No depression. Endocrine: No polyuria, no polydipsia, no polyphagia. Physical Examination :     Patient Vitals for the past 8 hrs:   BP Temp Temp src Pulse Resp SpO2   08/06/20 0930 (!) 122/59 96.6 °F (35.9 °C) Axillary -- -- --   08/06/20 0645 -- -- -- 66 23 93 %   08/06/20 0324 121/65 -- -- 67 23 99 %     General Appearance: Awake, alert, and in distress  Head:  Normocephalic, no trauma  Eyes: Pupils equal, round, reactive to light and accommodation; extraocular movements intact; sclera anicteric; conjunctivae pink. No embolic phenomena. ENT: Oropharynx clear, without erythema, exudate, or thrush. No tenderness of sinuses. Mouth/throat: mucosa pink and moist. No lesions. Dentition in good repair. Neck:Supple, without lymphadenopathy. Thyroid normal, No bruits. Pulmonary/Chest: Clear to auscultation, generally diminished throughout without wheezes, rales, or rhonchi. No dullness to percussion. Cardiovascular: Regular tachcardic rate and rhythm   Abdomen: Soft, non tender. Bowel sounds normal. No organomegaly  All four Extremities: Bilateral mild edema  Neurologic: No gross sensory or motor deficits. Skin: Warm and dry with good turgor. signs of peripheral arterial  insufficiency. No ulcerations. No open wounds. Medical Decision Making -Laboratory:   I have independently reviewed/ordered the following labs:    CBC with Differential:   Recent Labs     08/06/20  0358   WBC 6.5   HGB 9.9*   HCT 30.7*   *   LYMPHOPCT 8*   MONOPCT 6     BMP:   Recent Labs     08/05/20  1840      K 4.6      CO2 13*   BUN 63*   CREATININE 1.65*     Hepatic Function Panel:   Recent Labs     08/05/20  1840   PROT 5.8*   LABALBU 3.2*   BILITOT 0.96   ALKPHOS 76   ALT 29   AST 66*     No results for input(s): RPR in the last 72 hours. No results for input(s): HIV in the last 72 hours. No results for input(s): BC in the last 72 hours. Lab Results   Component Value Date    RBC 3.22 08/06/2020    WBC 6.5 08/06/2020     Lab Results   Component Value Date    CREATININE 1.65 08/05/2020    GLUCOSE 278 08/05/2020       Medical Decision Making-Imaging:       Medical Decision Tsksaf-Oieoewzj-Pakyx:       Medical Decision Making-Other:     Note:  · Labs, medications, radiologic studies were reviewed with personal review of films  · Moderate Large amounts of data were reviewed  · Discussed with nursing Staff, Discharge planner  · Infection Control and Prevention measures reviewed  · All prior entries were reviewed  · Administer medications as ordered  · Prognosis: Guarded  · Discharge planning reviewed  · Follow up as outpatient. Thank you for allowing us to participate in the care of this patient. Please call with questions.     Tamika Garcia MD  Pager: [phone] - Office: [phone]

## 2020-08-06 NOTE — PROGRESS NOTES
Writer spoke with patients daughter, Shubham Page. Provided update on patients POC and answered all questions.

## 2020-08-06 NOTE — PROGRESS NOTES
Physical Therapy    Facility/Department: [facility]  Initial Assessment    NAME: Ruddy Bansal  : 1932  MRN: [id number]    Date of Service: 2020  Ruddy Bansal is a 80 y.o. male who presents with shortness of breath. He was directly admitted from [facility] ED Aug 5 for the management of Acute respiratory failure due to COVID-19.     The patient was admitted to [facility] last week after having a syncopal event. He believes this was attributed to his b/p being low. During that stay he was diagnosed with MDS. The day of admission the pt was being evaluated by hematology when he reported worsening shortness of breath. Oxygen sats were noted to be in low 80's. Pt was sent to ED where he tested + for covid. CXR showed patchy infiltrates. Pt was started on 4L/nc but required non rebreather prior to transfer. He also received Decadron, Zithromax and Duoneb.       The hospitalist service did not feel comfortable admitting him at [facility]. He is a DNRCC-A and does not want CPR or intubation. 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 Discharge Recommendations:  Further therapy recommended at discharge. PT Equipment Recommendations  Equipment Needed: No    Assessment    Pt cooperative, motivated; SOB but able to get up to chair. Decreased balance, but transferred bed to chair without use of rw which he normally uses at home. Continue to assess for appropriate DC plan. Body structures, Functions, Activity limitations: Decreased functional mobility ; Decreased endurance;Decreased balance;Decreased posture  Prognosis: Good  Decision Making: Medium Complexity  PT Education: Goals;PT Role;Plan of Care;Home Exercise Program;Energy Conservation;General Safety;Gait Training;Functional Mobility Training  Barriers to Learning: none  REQUIRES PT FOLLOW UP: Yes  Activity Tolerance  Activity Tolerance: Patient limited by endurance  Activity Tolerance: pt SOB throughout PT session, frequent rest breaks needed; he stated he \"felt better\" up in the chair       Patient Diagnosis(es): There were no encounter diagnoses. has a past medical history of Aortic stenosis, Essential hypertension, Hyperlipidemia, Insulin-requiring or dependent type II diabetes mellitus ([facility] and MDS (myelodysplastic syndrome) ([facility] has a past surgical history that includes other surgical history (02/05/2020); other surgical history (Right, 10/09/2019); colectomy; Appendectomy; parathyroidectomy; and lumbar laminectomy (2008).     Restrictions  Restrictions/Precautions  Restrictions/Precautions: General Precautions, Fall Risk, Isolation, Contact Precautions, Up as Tolerated(O2 6L per nasal canula)  Required Braces or Orthoses?: No  Vision/Hearing  Vision: Impaired  Vision Exceptions: Wears glasses at all times  Hearing: Exceptions to [facility]  Hearing Exceptions: Hard of hearing/hearing concerns;Bilateral hearing aid(pt didn't want to wear his hearing aides although they were in the room)     Subjective  General  Patient assessed for rehabilitation services?: Yes  Response To Previous Treatment: Not applicable  Family / Caregiver Present: No  Follows Commands: Within Functional Limits  Pain Screening  Patient Currently in Pain: Denies  Vital Signs  Patient Currently in Pain: Denies       Orientation  Orientation  Overall Orientation Status: Within Normal Limits  Social/Functional History  Social/Functional History  Lives With: Alone  Type of Home: House  Home Layout: One level(pt states 1.5 levels but doesn't use the half level)  Home Access: Stairs to enter with rails  Entrance Stairs - Number of Steps: 2 WTE with 1 HR from garage into house  Home Equipment: Rolling walker  ADL Assistance: Independent  Ambulation Assistance: Independent(uses rwalker)  Transfer Assistance: Independent  Active : Yes  Mode of Transportation: Car  Type of occupation: retired dentist    Objective     Observation/Palpation  Posture: Fair    AROM RLE

## 2020-08-07 NOTE — PROGRESS NOTES
Occupational Therapy   Occupational Therapy Initial Assessment  Date: 2020   Patient Name: Kianna Mccollum  MRN: [id number]     : 1932    Date of Service: 2020    Discharge Recommendations:   .continue to assess       Assessment   Performance deficits / Impairments: Decreased functional mobility ; Decreased ADL status; Decreased high-level IADLs;Decreased endurance  Treatment Diagnosis: COVID  Prognosis: Good  Decision Making: Medium Complexity  Patient Education: pt ed on POc, purpose of eval, importance of movement, safety during functional transfers/functional mobility, energy conservation. good return  REQUIRES OT FOLLOW UP: Yes  Activity Tolerance  Activity Tolerance: Patient limited by fatigue;Patient Tolerated treatment well  Safety Devices  Safety Devices in place: Yes  Type of devices: Call light within reach; Left in bed  Restraints  Initially in place: No           Patient Diagnosis(es): There were no encounter diagnoses. has a past medical history of Aortic stenosis, Essential hypertension, Hyperlipidemia, Insulin-requiring or dependent type II diabetes mellitus ([facility] [address] [address] MDS (myelodysplastic syndrome) ([facility] [address] and Patient in clinical research study. has a past surgical history that includes other surgical history (2020); other surgical history (Right, 10/09/2019); colectomy; Appendectomy; parathyroidectomy; and lumbar laminectomy (). Treatment Diagnosis: COVID      Restrictions  Restrictions/Precautions  Restrictions/Precautions: Fall Risk  Required Braces or Orthoses?: No  Position Activity Restriction  Other position/activity restrictions: 15L non rebreather    Subjective   General  Patient assessed for rehabilitation services?: Yes  Family / Caregiver Present: No  Diagnosis: COVID  General Comment  Comments: RN ok'd for therapy this afternoon.  Pt agreeable to participate in session and cooperative/pleasant throughout  Patient Currently in Pain: Denies    Oxygen Therapy  O2 Device: Non-rebreather mask  O2 Flow Rate (L/min): 15 L/min    Social/Functional History  Social/Functional History  Lives With: Alone(pt reported friend coming to stay with pt)  Type of Home: House  Home Layout: Two level, Able to Live on Main level with bedroom/bathroom  Home Access: Stairs to enter with rails  Entrance Stairs - Number of Steps: 2  Entrance Stairs - Rails: Right  Bathroom Shower/Tub: Walk-in shower  Bathroom Toilet: Standard  Bathroom Equipment: Grab bars in shower, Shower chair  Home Equipment: Rolling walker(pt reported using RW majority of time)  ADL Assistance: Independent  Homemaking Assistance: Needs assistance  Homemaking Responsibilities: Yes(pt reported has \"lady\" that comes to assist with IADLs 1x/wk)  Meal Prep Responsibility: Secondary  Laundry Responsibility: Secondary  Cleaning Responsibility: Secondary  Ambulation Assistance: Independent  Transfer Assistance: Independent  Active : Yes  Mode of Transportation: Car  Occupation: Retired  Type of occupation: dentist       Objective   Vision: Impaired  Vision Exceptions: Wears glasses at all times  Hearing: Exceptions to [facility]  Hearing Exceptions: Hard of hearing/hearing concerns;Bilateral hearing aid    Orientation  Overall Orientation Status: Within Functional Limits     Balance  Sitting Balance: Independent(~5 minutes on EOB. O2 dropped to 91-92%)  Standing Balance: Contact guard assistance  Standing Balance  Time: ~2 minutes  Activity: pt  completed sit>stand transfer in order to straighten sheets  Comment: pt O2 dropped to 83% during standing.  Once returned to sitting up in bed, O2 increased to 92%  ADL  Feeding: Modified independent   Grooming: Modified independent   UE Bathing: Supervision  LE Bathing: Minimal assistance  UE Dressing: Supervision  LE Dressing: Minimal assistance(pt required min A for donning B socks while sitting on EOB)  Toileting: Contact guard assistance  Tone RUE  RUE Tone: Normotonic  Tone LUE  LUE Tone: Normotonic  Coordination  Movements Are Fluid And Coordinated: Yes     Bed mobility  Supine to Sit: Modified independent  Sit to Supine: Stand by assistance  Scooting: Stand by assistance  Transfers  Sit to stand: Contact guard assistance  Stand to sit: Contact guard assistance     Cognition  Overall Cognitive Status: WFL        Sensation  Overall Sensation Status: WFL        LUE AROM (degrees)  LUE AROM : WFL  Left Hand AROM (degrees)  Left Hand AROM: WFL  RUE AROM (degrees)  RUE AROM : WFL  Right Hand AROM (degrees)  Right Hand AROM: WFL  LUE Strength  Gross LUE Strength: WFL  L Hand General: 4+/5  RUE Strength  Gross RUE Strength: WFL  R Hand General: 4+/5         Plan   Plan  Times per week: 2-3x/wk           AM-PAC Score        AM-PAC Inpatient Daily Activity Raw Score: 20 (08/07/20 1625)  AM-PAC Inpatient ADL T-Scale Score : 42.03 (08/07/20 1625)  ADL Inpatient CMS 0-100% Score: 38.32 (08/07/20 1625)  ADL Inpatient CMS G-Code Modifier : [place] Slice (08/07/20 1625)    Goals  Short term goals  Time Frame for Short term goals: pt will, by discharge  Short term goal 1: complete LB ADLs and toileting tasks with SBA and set up  Short term goal 2: complete UB ADLs and grooming tasks with mod I  Short term goal 3: increase activity tolerance to 15+ minutes in order to participate in daily tasks  Short term goal 4: dem SBAduring functional transfers/functional mobility with LRD, if needed  Short term goal 5: dem understanding and ind initiate use of 2-3 energy conservation tech       Therapy Time   Individual Concurrent Group Co-treatment   Time In 1446         Time Out 1503         Minutes 17         Timed Code Treatment Minutes: Lars 1765, OTR/L

## 2020-08-07 NOTE — PROGRESS NOTES
Pharmacy Note  COVID-19 Anticoagulation Adjustment    You Swann is a 80 y.o. male. Pharmacist assessment of anticoagulation in a SARS-CoV-2 positive patient. Recent Labs     08/06/20  0657 08/07/20  0420   BUN 59* 53*       Recent Labs     08/06/20  0657 08/07/20  0420   CREATININE 1.22* 1.15     Recent Labs     08/06/20  0358   DDIMER 1.46        Estimated Creatinine Clearance: 43 mL/min (based on SCr of 1.15 mg/dL). Height:   Ht Readings from Last 1 Encounters:   08/05/20 5' 8\" (1.727 m)     Weight:  Wt Readings from Last 1 Encounters:   08/05/20 160 lb 0.9 oz (72.6 kg)     BMI:  Body mass index is 24.34 kg/m². Per the [facility] Anticoagulation Algorithm for COVID-19 positive or clinically-suspected patients, the following adjustment has been made per P&T Guidelines:             Discontinue heparin.  Change to enoxaparin 30 mg SC BID for patient with CrCl >30 ml/min and BMI below 30 kg/M2    Thank you,  Kelvin Ramirez, PharmD, BCPS  8/7/2020  12:59 PM

## 2020-08-07 NOTE — RESEARCH
Clinical Research Services          Patient Name: Mordecai Dandy  MRN: [id number]  [place]: 7/8/1932  Date of evaluation: 8/7/2020  Time of evaluation: 0900  Reason for evaluation:      [x]  Expanded Access to Convalescent Plasma for the Treatment of Patients with COVID-19  Protocol # [id number]     IND# [id number]       NCT# [id number]        [x]  On 08/06/2020 at 2105 the subject received 1 unit of ABO compatible COVID-19 convalescent plasma. [x]  Chart reviewed and after discussion with the subjects RN, no adverse reactions were noted from the convalescent plasma. This was all reviewed with Dr. Agnle Pinto.   [x]  Subject continues in follow-up     883 Evelina Dunham RN     Clinical Research Nurse  For questions page or perfect serve Dr. Angle Pinto, Dr. Jean Marie Fall, Dr. Mary Sanchez, Dr. Palmira Eugene, Dr Dima Lutz, Dr. Leland Reeves or contact the research nurse at [phone]    Judi Vicente MD

## 2020-08-07 NOTE — PROGRESS NOTES
[facility] [address]    Progress Note    8/7/2020    5:42 PM    Name:   You Swann  MRN:     [id number]     [place]:      [de-identified]   Room:   [address] [place] [address] Day:  2  Admit Date:  8/5/2020  2:22 PM    PCP:   Pina Ugarte  Code Status:  DNR-CCA    Subjective:     C/C: Shortness of breath    Interval History Status: improved. Patient seen and examined this afternoon. Sitting up in bed. Tachycardic overnight. Chest x-ray worsening this morning. IV fluids were stopped secondary to likely volume overload. Patient was given gentle diuresis this morning. Renal function is improving. He reports that he feels pretty much the same. He does have a slight appetite. Says that he is winded. Currently on nonrebreather. Brief History:     Per my associate: At 5 together grossly the second is wondering if there are some notes like you get interrupted with talking pages about this patient's ongoing Dexter Nelson is a 80 y.o. male who presents with shortness of breath. He was directly admitted from [facility] ED Aug 5 for the management of Acute respiratory failure due to COVID-19.     The patient was admitted to [facility] last week after having a syncopal event. He believes this was attributed to his b/p being low. During that stay he was diagnosed with MDS. Today the pt was being evaluated by hematology when he reported worsening shortness of breath. Oxygen sats were noted to be in low 80's. Pt was sent to ED where he tested + for covid. CXR showed patchy infiltrates. Pt was started on 4L/nc but required non rebreather prior to transfer. He also received Decadron, Zithromax and Duoneb.       The hospitalist service did not feel comfortable admitting him at [facility]. He is a DNRCC-A and does not want CPR or intubation.   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      Pertinent Diagnostics on Admit:  WBC 9.6, hgb 11.3, platelets 164, lymphocytes 8, Glucose 179, BUN/Creat 76/2.43, ALT 36, AST 74, ALP 76, T bili 1.5, Troponin 17, D dimer 1.75, INR 1.16,  (0-100)     ABG's:  7.44 / 48 / 21 / 14.2 / -8 on 36% O2     Resp Viral panel:  + Covid 19     CXR:  Faint small scattered hazy opacities bi-basilar    Review of Systems:     Constitutional:  negative for chills, fevers, sweats  Respiratory: Reports shortness of breath; reports cough; negative for wheezing  Cardiovascular:  negative for chest pain, chest pressure/discomfort, lower extremity edema, palpitations  Gastrointestinal:  negative for abdominal pain, constipation, diarrhea, nausea, vomiting  Neurological:  negative for dizziness, headache  MSK: reports neck pain/neck stiffness    Medications: Allergies:     Allergies   Allergen Reactions    Lovaza [Omega-3-Acid Ethyl Esters] Anaphylaxis    Metformin And Related Anaphylaxis       Current Meds:   Scheduled Meds:    bumetanide  1 mg Intravenous Daily    enoxaparin  30 mg Subcutaneous BID    [START ON 8/8/2020] dexamethasone  6 mg Oral Daily    sodium chloride  20 mL Intravenous Once    amLODIPine  5 mg Oral Daily    insulin glargine  20 Units Subcutaneous Nightly    sodium chloride flush  10 mL Intravenous 2 times per day    insulin lispro  0-12 Units Subcutaneous TID WC    insulin lispro  0-6 Units Subcutaneous Nightly    metoprolol tartrate  50 mg Oral BID    calcium citrate  950 mg Oral Daily    therapeutic multivitamin-minerals  1 tablet Oral Daily    pantoprazole  40 mg Oral QAM AC    remdesivir IVPB  100 mg Intravenous Q24H     Continuous Infusions:    dextrose       PRN Meds: baclofen, glucose, dextrose, glucagon (rDNA), dextrose, sodium chloride flush, potassium chloride **OR** potassium alternative oral replacement **OR** potassium chloride, magnesium sulfate, acetaminophen **OR** acetaminophen, polyethylene glycol, promethazine **OR** ondansetron, acetaminophen, traMADol, sodium chloride    Data: Past Medical History:   has a past medical history of Aortic stenosis, Essential hypertension, Hyperlipidemia, Insulin-requiring or dependent type II diabetes mellitus ([facility] [address] [facility]elodysplastic syndrome) [facility] and Patient in clinical research study. Social History:   reports that he quit smoking about 3 years ago. His smoking use included cigarettes. He started smoking about 53 years ago. He does not have any smokeless tobacco history on file. He reports current alcohol use of about 1.0 standard drinks of alcohol per week. Family History:   Family History   Problem Relation Age of Onset    Breast Cancer Father     Prostate Cancer Father     Prostate Cancer Brother        Vitals:  BP (!) 135/59   Pulse 86   Temp 96.8 °F (36 °C) (Axillary)   Resp 29   Ht 5' 8\" (1.727 m)   Wt 160 lb 0.9 oz (72.6 kg)   SpO2 98%   BMI 24.34 kg/m²   Temp (24hrs), Av.4 °F (36.3 °C), Min:96.8 °F (36 °C), Max:97.9 °F (36.6 °C)    Recent Labs     20  2141 20  0903 20  1218 20  1701   POCGLU 190* 179* 239* 194*       I/O (24Hr):     Intake/Output Summary (Last 24 hours) at 2020 1742  Last data filed at 2020 1200  Gross per 24 hour   Intake 2947.13 ml   Output 1200 ml   Net 1747.13 ml       Labs:  Hematology:  Recent Labs     20  18420  0358 20  0420   WBC  --  6.5 9.9   RBC  --  3.22* 3.52*   HGB  --  9.9* 10.6*   HCT  --  30.7* 33.9*   MCV  --  95.3 96.3   MCH  --  30.7 30.1   MCHC  --  32.2 31.3   RDW  --  13.9 13.9   PLT  --  111* 116*   MPV  --  10.9 9.6   .9*  --   --    INR  --  1.1  --    DDIMER  --  1.46  --      Chemistry:  Recent Labs     20  0657 20  0420    138 142   K 4.6 4.7 4.5    109* 109*   CO2 13* 16* 16*   GLUCOSE 278* 215* 178*   BUN 63* 59* 53*   CREATININE 1.65* 1.22* 1.15   MG  --  2.4  --    ANIONGAP 18* 13 17   LABGLOM 40* 56* >60   GFRAA 48* >60 >60   CALCIUM 8.0* 8.4* 8.7     Recent Labs 08/05/20  1840  08/06/20  0657  08/06/20  1226 08/06/20  1618 08/06/20  2141 08/07/20  0420 08/07/20  0903 08/07/20  1218 08/07/20  1701   PROT 5.8*  --  6.1*  --   --   --   --  6.1*  --   --   --    LABALBU 3.2*  --  3.2*  --   --   --   --  3.2*  --   --   --    AST 66*  --  63*  --   --   --   --  49*  --   --   --    ALT 29  --  34  --   --   --   --  33  --   --   --    *  --   --   --   --   --   --   --   --   --   --    ALKPHOS 76  --  91  --   --   --   --  94  --   --   --    BILITOT 0.96  --  1.06  --   --   --   --  1.00  --   --   --    POCGLU  --    < >  --    < > 295* 266* 190*  --  179* 239* 194*    < > = values in this interval not displayed. ABG:No results found for: POCPH, PHART, PH, POCPCO2, PZK6ORO, PCO2, POCPO2, PO2ART, PO2, POCHCO3, IUN2DVF, HCO3, NBEA, PBEA, BEART, BE, THGBART, THB, YGB3SMB, DQOM5HLY, Q8TKGSTT, O2SAT, FIO2  No results found for: SPECIAL  No results found for: CULTURE    Radiology:  No results found.     Physical Examination:        General appearance:  alert, cooperative and no distress, elderly  gentleman sitting up in bed  Mental Status:  oriented to person, place and time and normal affect  Lungs:  clear to auscultation bilaterally, no wheezing, diminished lung sounds bibasilarly normal effort  Heart:  regular rate and rhythm, no murmur  Abdomen:  soft, nontender, nondistended, normal bowel sounds, no masses, hepatomegaly, splenomegaly  Extremities:  no edema, redness, tenderness in the calves  Skin:  no gross lesions, rashes, induration    Assessment:        Hospital Problems           Last Modified POA    * (Principal) Acute respiratory failure due to COVID-19 8/6/2020 Yes    Ac[facility] with [facility] 8/6/2020 Yes    Pneumonia due to COVID-19 virus 8/6/2020 Yes    MDS (myelodysplastic syndrome) ([facility] 8/6/2020 Yes    Insulin-requiring or dependent type II diabetes mellitus ([facility] 8/6/2020 Yes    Hyperlipidemia 8/6/2020 Yes Essential hypertension 8/6/2020 Yes    Aortic stenosis 8/5/2020 Yes          Plan:        1. Appreciate infectious disease input. 2. Patient to be started on Remdesivir and received convalescent plasma. 3. Patient started on Decadron through 8/10  4. COVID-19 positive, maintain droplet plus precautions, maintain SPO2 greater than 90%. 5. Renal function is improved. Stop IV fluids. Trial of Lasix this morning. 6. Monitor blood glucoses. Maintain insulin sliding scale for coverage  7. Continue to hold lisinopril in setting of BABAK. Monitor blood pressure  8. Continue metoprolol which is a substitute with the pharmacy for bisoprolol  9. Continue amlodipine  10. Increase Lantus to 30 units nightly while on Decadron  11. Continue daily labs  12. DVT ppx. GI ppx  13. Will update family today.       33 Patti Petersen DO  8/7/2020  5:42 PM

## 2020-08-07 NOTE — PROGRESS NOTES
[facility] - Progress Note  COVID 19 Patient  Today's Date and Time: 8/7/2020, 10:59 AM    Impression :   · COVID 19 Suspect  · COVID 19 Confirmed Infection  · COVID tested [facility] on 8/5/2020 positive  · High Inflammatory markers  · BABAK on CKD  · DM insulin dependent  · HTN  · Myelodysplastic Syndrome  · Chronic back pain    Recommendations:   · Diuresis  · Started Remdesivir 8/5-8/9  · Started Decadron 8/5-8/10  · The clinical research staff will investigate pt eligibility for COVID clinical trials. He will get immune plasma. · CXR 8-6-20 shows worsening of infiltrate. Suggest diuresis    Medical Decision Making/Summary/Discussion:8/7/2020     · Patient admitted with suspected COVID 19 infection  · Covid test confirmed positive. · Multiple co morbidities. Pt with hypoxia, requiring 15L NRB and BABAK  · Transferred from [facility] to [facility] for further care  · Plan to start Remdesivir 8/5-8/9  · Start Decadron 6mg IV daily 8/5-8/10  · Patient has agreed to convalescent plasma  · Pt is clear that he is a DRN CCA - no intubation or chest compressions   Infection Control Recommendations   · Universal Precautions  · Airborne isolation  · Droplet Isolation    Antimicrobial Stewardship Recommendations     · Simplification of therapy  · Targeted therapy    Coordination of Outpatient Care:   · Estimated Length of IV antimicrobials:8/5-8/9  · Patient will need Midline Catheter Insertion: TBD  · Patient will need PICC line Insertion: No  · Patient will need: Home IV , [place],  SNF,  LTAC:TBD  · Patient will need outpatient wound care:No    Chief complaint/reason for consultation:   · Concern for COVID infection      History of Present Illness:   Blake Pedroza is a 80y.o.-year-old  male who was initially admitted on 8/5/2020.  Patient seen at the request of Dr. Kenia Escobedo:    Patient is an 79 yo gentleman with a PMH of DM II - insulin dependent, HTN, CKD and a new diagnosis of myelodysplastic syndrome. He was hospitalized and [facility] from July 28-30 after falling at home. Today (8-5) he was at his Hematologists office and reported that he was SOB. His SaO2 was in the low 80's and he was transported to the ED. Per the notes, a CXR showed patchy infiltrates, pt was tachycardic with SaO2 85% on RA. Labs showed  Cr 2.47  D-dimer 1.75  [id number]    AST 74  ALT 36    Viral PCR negative  COVID positive    Pt was transferred to [facility] for further care. CURRENT EVALUATION :8/7/2020    Patient evaluated and examined in the ICU. He is AAO, reporting that he has been feeling very weak at home. No SOB, but nausea and diarrhea. Poor appetite, little PO intake. He states that he fell on 8-4-20 due to his weakness. Pt denies fevers or chills, no cough, no sputum production    He is clear that he does not wish to be intubated or have chest compressions. He does wish full treatment until either of those is necessary. Pt is AAO  Improved from 15 L/min to 6 L/min NC and then back to 15 L/min nonrebreather   Ttachycardic 130's-->94-->102    Afebrile    Patient exhibiting respiratory distress Yes  Respiratory secretions: No    Patient receiving supplemental oxygen. 15L NRB-->6L min-->15L NRB  02 sat  99-->95%  RR 24-->31      QTc: 486    (Definition of High Risk Comorbid Conditions: Asthma, COPD, Heart Failure of any cause, DM, Hematologic malignancy, Immunocompromised taking >20 mg/day Prednisone). Baseline Number of High Risk Comorbid conditions:    5  Respiratory Support Level Score: (Room Air= 1 ;Supplemental 02 1L to 15 L/min= 2; Intubation and mechanical Ventilation = 3; Failure to support Ventilatory needs resulting in death =4)   2    NEWS Score: 0-4 Low risk group; 5-6: Medium risk group; 7 or above: High risk group  Parameters 3 2 1 0 1 2 3   Age    < 65   ? 65   RR ? 8  9-11 12-20  21-24 ? 25   O2 Sats ?  80 92-93 94-95 ? 96      Suppl O2  Yes  No      SBP ? 90  101-110 111-219   ? 220   HR ? 40  41-50 51-90  111-130 ? 131   Consciousness    Alert   Drowsiness, lethargy, or confusion   Temperature ? 35.0 C (95.0 F)  35.1-36.0 C 95.1-96.9 F 36.1-38.0 C 97.0-100.4 F 38.1-39.0 C 100.5-102.3 F ? 39.1 C ? 102.4 F      NEWS Score:   8-5-20: 12 high risk    Overall Daily Picture:    Slight worsening of chest x-ray 8-6-20    Presence of secondary bacterial Infection:  No   Additional antibiotics: None     Labs, X rays reviewed: 8/7/2020    BUN: 76-->63-->53  Cr: 2.43-->1.65-->1.15  D dimer 1.75    WBC: 9.6-->6.5-->9.9  Hb: 11.3-->9.9-->10.6  Plat: 164-->111-->116    Absolute Neutrophils:5.59  Absolute Lymphocytes:0.52  Neutrophil/Lymphocyte Ratio: 10.7 high risk    CRP: 140.9  Ferritin: 7,343  LDH: 803    Pro Calcitonin:0.32    Cultures:  Urine:  ·   Blood:  ·   Sputum :  ·   Wound:       CXR:   · 8/6/2020: Bilateral parenchymal abnormalities, superimposed vascular congestion and bibasilar atelectasis. Small left pleural effusion  CAT:    Discussed with patient, RN, IM.    8/6/2020    I have personally reviewed the past medical history, past surgical history, medications, social history, and family history, and I have updated the database accordingly.   Past Medical History:     Past Medical History:   Diagnosis Date    Aortic stenosis     Essential hypertension     Hyperlipidemia     Insulin-requiring or dependent type II diabetes mellitus ([facility] [address]     MDS (myelodysplastic syndrome) ([facility] [address]     Patient in clinical research study 08/06/2020    Convalescent plasma Expected date of completion 9/6/2020       Past Surgical  History:     Past Surgical History:   Procedure Laterality Date    APPENDECTOMY      COLECTOMY      LUMBAR LAMINECTOMY  2008    OTHER SURGICAL HISTORY  02/05/2020    Right shoulder joint injection and right L4/L5 TFESI    OTHER SURGICAL HISTORY Right 10/09/2019    Right L4 and 5 TFESI    PARATHYROIDECTOMY         Medications:      sodium chloride  20 mL Intravenous Once    amLODIPine  5 mg Oral Daily    insulin glargine  20 Units Subcutaneous Nightly    sodium chloride flush  10 mL Intravenous 2 times per day    insulin lispro  0-12 Units Subcutaneous TID WC    insulin lispro  0-6 Units Subcutaneous Nightly    heparin (porcine)  5,000 Units Subcutaneous 3 times per day    metoprolol tartrate  50 mg Oral BID    calcium citrate  950 mg Oral Daily    therapeutic multivitamin-minerals  1 tablet Oral Daily    pantoprazole  40 mg Oral QAM AC    dexamethasone  6 mg Intravenous Daily    remdesivir IVPB  100 mg Intravenous Q24H       Social History:     Social History     Socioeconomic History    Marital status:      Spouse name: Not on file    Number of children: Not on file    Years of education: Not on file    Highest education level: Not on file   Occupational History    Not on file   Social Needs    Financial resource strain: Not on file    Food insecurity     Worry: Not on file     Inability: Not on file    Transportation needs     Medical: Not on file     Non-medical: Not on file   Tobacco Use    Smoking status: Former Smoker     Types: Cigarettes     Start date: 1967     Last attempt to quit: 2017     Years since quitting: 3.6   Substance and Sexual Activity    Alcohol use:  Yes     Alcohol/week: 1.0 standard drinks     Types: 1 Glasses of wine per week     Frequency: Monthly or less     Drinks per session: 1 or 2    Drug use: Not on file    Sexual activity: Not on file   Lifestyle    Physical activity     Days per week: Not on file     Minutes per session: Not on file    Stress: Not on file   Relationships    Social connections     Talks on phone: Not on file     Gets together: Not on file     Attends [employer]ism service: Not on file     Active member of club or organization: Not on file     Attends meetings of clubs or organizations: Not on file     Relationship status: Not on file    Intimate partner violence     Fear of current or ex partner: Not on file     Emotionally abused: Not on file     Physically abused: Not on file     Forced sexual activity: Not on file   Other Topics Concern    Not on file   Social History Narrative    Not on file       Family History:     Family History   Problem Relation Age of Onset    Breast Cancer Father     Prostate Cancer Father     Prostate Cancer Brother         Allergies:   Lovaza [omega-3-acid ethyl esters] and Metformin and related     Review of Systems:     Constitutional: No fevers or chills. Generalized malaise, weakness and fatigue  Head: No headaches  Eyes: No double vision or blurry vision. No conjunctival inflammation. ENT: No sore throat or runny nose. . No hearing loss, tinnitus or vertigo. Cardiovascular: No chest pain or palpitations. No shortness of breath. No SANTIAGO  Lung: No shortness of breath or cough. No sputum production  Abdomen:  nausea, diarrhea, no vomiting or abdominal pain. Geralene Naomy No cramps. Genitourinary: No increased urinary frequency, or dysuria. No hematuria. No suprapubic or CVA pain  Musculoskeletal: Chronic back pain  Hematologic: Rt toes with ecchymosis. Pt able to wriggle all toes without pain  Neurologic: No headache, weakness, numbness, or tingling. Integument: No rash, no ulcers. Psychiatric: No depression. Endocrine: No polyuria, no polydipsia, no polyphagia. Physical Examination :     Patient Vitals for the past 8 hrs:   BP Temp Temp src Pulse Resp SpO2   08/07/20 0900 (!) 141/48 97.9 °F (36.6 °C) Axillary 102 (!) 31 --   08/07/20 0402 134/62 97.2 °F (36.2 °C) Oral 81 26 94 %     General Appearance: Awake, alert, and in distress  Head:  Normocephalic, no trauma  Eyes: Pupils equal, round, reactive to light and accommodation; extraocular movements intact; sclera anicteric; conjunctivae pink. No embolic phenomena. ENT: Oropharynx clear, without erythema, exudate, or thrush.  No tenderness of sinuses. Mouth/throat: mucosa pink and moist. No lesions. Dentition in good repair. Neck:Supple, without lymphadenopathy. Thyroid normal, No bruits. Pulmonary/Chest: Clear to auscultation, generally diminished throughout without wheezes, rales, or rhonchi. No dullness to percussion. Cardiovascular: Regular tachcardic rate and rhythm   Abdomen: Soft, non tender. Bowel sounds normal. No organomegaly  All four Extremities: Bilateral mild edema  Neurologic: No gross sensory or motor deficits. Skin: Warm and dry with good turgor. signs of peripheral arterial  insufficiency. No ulcerations. No open wounds. Medical Decision Making -Laboratory:   I have independently reviewed/ordered the following labs:    CBC with Differential:   Recent Labs     08/06/20  0358 08/07/20  0420   WBC 6.5 9.9   HGB 9.9* 10.6*   HCT 30.7* 33.9*   * 116*   LYMPHOPCT 8* 7*   MONOPCT 6 5     BMP:   Recent Labs     08/06/20  0657 08/07/20  0420    142   K 4.7 4.5   * 109*   CO2 16* 16*   BUN 59* 53*   CREATININE 1.22* 1.15   MG 2.4  --      Hepatic Function Panel:   Recent Labs     08/06/20  0657 08/07/20  0420   PROT 6.1* 6.1*   LABALBU 3.2* 3.2*   BILITOT 1.06 1.00   ALKPHOS 91 94   ALT 34 33   AST 63* 49*     No results for input(s): RPR in the last 72 hours. No results for input(s): HIV in the last 72 hours. No results for input(s): BC in the last 72 hours.   Lab Results   Component Value Date    RBC 3.52 08/07/2020    WBC 9.9 08/07/2020     Lab Results   Component Value Date    CREATININE 1.15 08/07/2020    GLUCOSE 178 08/07/2020       Medical Decision Making-Imaging:       Medical Decision [id number]:       Medical Decision Making-Other:     Note:  · Labs, medications, radiologic studies were reviewed with personal review of films  · Moderate Large amounts of data were reviewed  · Discussed with nursing Staff, Discharge planner  · Infection Control and Prevention measures reviewed  · All prior entries were reviewed  · Administer medications as ordered  · Prognosis: Guarded  · Discharge planning reviewed  · Follow up as outpatient. Thank you for allowing us to participate in the care of this patient. Please call with questions.     Kiana Desouza MD  Pager: [phone] - Office: [phone]

## 2020-08-07 NOTE — PROGRESS NOTES
Physical Therapy  Facility/Department: [facility]  Daily Treatment Note  NAME: Eric Zamora  : 1932  MRN: [id number]    Date of Service: 2020    Discharge Recommendations:  Patient would benefit from continued therapy after discharge   PT Equipment Recommendations  Equipment Needed: No    Assessment   Body structures, Functions, Activity limitations: Decreased functional mobility ; Decreased endurance;Decreased balance;Decreased posture  Assessment: Pt requiring CGA for mobility, pt grossly limited by labored breathing and SOB, pt pivoted chair to/from commode only, O2 sats dropped to 83% and RR increased to 39 on 15L non re-breather. Unable to amb this session d/t respiratory status, Pt would benefit from continued PT to address strengthening and [place] with all mobility  Prognosis: Good  Decision Making: Medium Complexity  PT Education: PT Role;Home Exercise Program;Energy Conservation;General Safety;Gait Training;Functional Mobility Training  Patient Education: Proper breathing techniques, slow controlled breathing, incentive spirometer  Barriers to Learning: none  REQUIRES PT FOLLOW UP: Yes  Activity Tolerance  Activity Tolerance: Patient limited by endurance;Treatment limited secondary to medical complications (free text)  Activity Tolerance: SOB and labored breathing with all activity     Patient Diagnosis(es): There were no encounter diagnoses. has a past medical history of Aortic stenosis, Essential hypertension, Hyperlipidemia, Insulin-requiring or dependent type II diabetes mellitus ([facility] [address] MDS (myelodysplastic syndrome) ([facility] [address] and Patient in clinical research study. has a past surgical history that includes other surgical history (2020); other surgical history (Right, 10/09/2019); colectomy; Appendectomy; parathyroidectomy; and lumbar laminectomy ().     Restrictions  Restrictions/Precautions  Restrictions/Precautions: General Precautions, Fall Risk, Isolation, Contact Precautions, Up as Tolerated  Required Braces or Orthoses?: No  Position Activity Restriction  Other position/activity restrictions: 15L non rebreather  Subjective   General  Response To Previous Treatment: Patient with no complaints from previous session. Family / Caregiver Present: No  Subjective  Subjective: RN and pt agreed to PT, pt awake in chair upon arrival on 15L O2 via non re-breather  Pain Screening  Patient Currently in Pain: Denies  Vital Signs  Patient Currently in Pain: Denies       Orientation  Orientation  Overall Orientation Status: Within Normal Limits       Objective      Transfers  Sit to Stand: Minimal Assistance;Contact guard assistance  Stand to sit: Contact guard assistance  Bed to Chair: Contact guard assistance(w/o AD)  Stand Pivot Transfers: Contact guard assistance(w/o AD)  Comment: O2 sats decreased to 83% and RR increased to 39 with stand pivot from commode to chair  Ambulation  Ambulation?: No(Not appropriate at this time d/t respiratory status)  Stairs/Curb  Stairs?: No     Balance  Posture: Fair  Sitting - Static: Good  Sitting - Dynamic: Good  Standing - Static: Fair;+  Standing - Dynamic: Fair  Comments: Static standing x 1min w/o AD CGA, O2 sats decreased to mid 80's and RR increased to low 30's  Exercises  Seated LE exercise program: Long Arc Quads, hip abduction/adduction, heel/toe raises, and marches. Reps: x 10  Upper extremity exercises: Bicep curl, shoulder flexion/extension, punches.  Reps: x 10  Issued and educated pt on incentive spirometer, pt performed 10x   Pt needed frequent rest breaks to recovery secondary to SOB                  Goals  Short term goals  Time Frame for Short term goals: 12 visits  Short term goal 1: independent bed mobility without use of bedrails  Short term goal 2: independent transfers  Short term goal 3: independent gait with rw x 50'  Short term goal 4: independent stair ambulation x 2 steps with 1 HR  Patient Goals   Patient goals : breathe better, return home    Plan    Plan  Times per week: 5-6 visits weekly  Times per day: Daily  Current Treatment Recommendations: Strengthening, ROM, Balance Training, Functional Mobility Training, Transfer Training, Endurance Training, Gait Training, Stair training, Safety Education & Training, Positioning  Safety Devices  Type of devices: Call light within reach, Gait belt, Patient at risk for falls, Left in chair, Nurse notified(No chair alarm upon arrival)     Therapy Time   Individual Concurrent Group Co-treatment   Time In 0947         Time Out 1021         Minutes 34          Time coded minutes [address], PTA

## 2020-08-08 NOTE — CONSULTS
[facility]   Consult               Today's Date: 8/8/2020  Patient Name: Irma Clark  Date of admission: 8/5/2020  2:22 PM  Patient's age: 80 y.o., 7/8/1932  Admission Dx: Pneumonia [J18.9]    Reason for Admission:  COVID-19 +ve    CHIEF COMPLAINT:  SOB  No chief complaint on file. History Obtained From:  patient, electronic medical record    HISTORY OF PRESENT ILLNESS:    80 y.o. male who presents with shortness of breath. He was directly admitted from [facility] ED Aug 5 for the management of Acute respiratory failure due to COVID-19.     The patient was admitted to [facility] last week after having a syncopal event. He believes this was attributed to his b/p being low. During that stay he was diagnosed with MDS. Today the pt was being evaluated by hematology when he reported worsening shortness of breath. Oxygen sats were noted to be in low 80's. Pt was sent to ED where he tested + for covid. CXR showed patchy infiltrates. Pt was started on 4L/nc but required non rebreather prior to transfer. He also received Decadron, Zithromax and Duoneb.       He is a DNRCC-A and does not want CPR or intubation. 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 Cardiology consulted due to trigeminy noted on telemetry. Past Medical History:   has a past medical history of Aortic stenosis, Essential hypertension, Hyperlipidemia, Insulin-requiring or dependent type II diabetes mellitus ([facility] [address] MDS (myelodysplastic syndrome) ([facility] [address] and Patient in clinical research study. Past Surgical History:   has a past surgical history that includes other surgical history (02/05/2020); other surgical history (Right, 10/09/2019); colectomy; Appendectomy; parathyroidectomy; and lumbar laminectomy (2008). Home Medications:    Prior to Admission medications    Medication Sig Start Date End Date Taking?  Authorizing Provider   traMADol (ULTRAM) 50 MG tablet Take 50 mg by mouth 2 times daily as needed for Pain.    Yes Historical Provider, MD   Multiple Vitamins-Minerals (CENTRUM SILVER) TABS Take 1 tablet by mouth daily   Yes Historical Provider, MD   Coenzyme Q10 (COQ10 PO) Take 1 tablet by mouth daily   Yes Historical Provider, MD   Omega-3-acid Ethyl Esters (LOVAZA PO) Take by mouth   Yes Historical Provider, MD   omeprazole (PRILOSEC) 20 MG delayed release capsule Take 20 mg by mouth daily   Yes Historical Provider, MD   Insulin Degludec (TRESIBA) 100 UNIT/ML SOLN Inject 0.8 Units into the skin Daily with supper   Yes Historical Provider, MD   acetaminophen (TYLENOL) 325 MG tablet Take 325 mg by mouth every 6 hours as needed for Pain   Yes Historical Provider, MD   Cholecalciferol (VITAMIN D3) 250 MCG (57393 UT) CAPS Take 1 capsule by mouth daily   Yes Historical Provider, MD   Phytonadione (VITAMIN K1) POWD 500 mg by Does not apply route daily   Yes Historical Provider, MD   amLODIPine (NORVASC) 5 MG tablet Take 5 mg by mouth daily   Yes Historical Provider, MD   bisoprolol (ZEBETA) 10 MG tablet Take 20 mg by mouth daily   Yes Historical Provider, MD   calcium citrate (CALCITRATE) 950 MG tablet Take 1 tablet by mouth daily   Yes Historical Provider, MD   chlorthalidone (HYGROTON) 25 MG tablet Take 25 mg by mouth daily   Yes Historical Provider, MD        Current Facility-Administered Medications: enoxaparin (LOVENOX) injection 30 mg, 30 mg, Subcutaneous, BID  dexamethasone (DECADRON) tablet 6 mg, 6 mg, Oral, Daily  insulin glargine (LANTUS) injection vial 30 Units, 30 Units, Subcutaneous, Nightly  0.9 % sodium chloride bolus, 20 mL, Intravenous, Once  amLODIPine (NORVASC) tablet 5 mg, 5 mg, Oral, Daily  baclofen (LIORESAL) tablet 5 mg, 5 mg, Oral, TID PRN  glucose (GLUTOSE) 40 % oral gel 15 g, 15 g, Oral, PRN  dextrose 50 % IV solution, 12.5 g, Intravenous, PRN  glucagon (rDNA) injection 1 mg, 1 mg, Intramuscular, PRN  dextrose 5 % solution, 100 mL/hr, Intravenous, PRN  sodium chloride flush 0.9 % injection 10 mL, 10 mL, Intravenous, 2 times per day  sodium chloride flush 0.9 % injection 10 mL, 10 mL, Intravenous, PRN  potassium chloride (KLOR-CON M) extended release tablet 40 mEq, 40 mEq, Oral, PRN **OR** potassium bicarb-citric acid (EFFER-K) effervescent tablet 40 mEq, 40 mEq, Oral, PRN **OR** potassium chloride 10 mEq/100 mL IVPB (Peripheral Line), 10 mEq, Intravenous, PRN  magnesium sulfate 1 g in dextrose 5% 100 mL IVPB, 1 g, Intravenous, PRN  acetaminophen (TYLENOL) tablet 650 mg, 650 mg, Oral, Q6H PRN **OR** acetaminophen (TYLENOL) suppository 650 mg, 650 mg, Rectal, Q6H PRN  polyethylene glycol (GLYCOLAX) packet 17 g, 17 g, Oral, Daily PRN  promethazine (PHENERGAN) tablet 12.5 mg, 12.5 mg, Oral, Q6H PRN **OR** ondansetron (ZOFRAN) injection 4 mg, 4 mg, Intravenous, Q6H PRN  insulin lispro (HUMALOG) injection vial 0-12 Units, 0-12 Units, Subcutaneous, TID WC  insulin lispro (HUMALOG) injection vial 0-6 Units, 0-6 Units, Subcutaneous, Nightly  acetaminophen (TYLENOL) tablet 325 mg, 325 mg, Oral, Q6H PRN  metoprolol tartrate (LOPRESSOR) tablet 50 mg, 50 mg, Oral, BID  calcium citrate (CALCITRATE) tablet 950 mg, 950 mg, Oral, Daily  therapeutic multivitamin-minerals 1 tablet, 1 tablet, Oral, Daily  pantoprazole (PROTONIX) tablet 40 mg, 40 mg, Oral, QAM AC  traMADol (ULTRAM) tablet 50 mg, 50 mg, Oral, Q6H PRN  [COMPLETED] remdesivir 200 mg in sodium chloride 0.9 % 250 mL IVPB, 200 mg, Intravenous, Once **FOLLOWED BY** remdesivir 100 mg in sodium chloride 0.9 % 250 mL IVPB, 100 mg, Intravenous, Q24H  0.9 % sodium chloride bolus, 30 mL, Intravenous, PRN    Allergies:  Lovaza [omega-3-acid ethyl esters] and Metformin and related    Social History:   reports that he quit smoking about 3 years ago. His smoking use included cigarettes. He started smoking about 53 years ago. He does not have any smokeless tobacco history on file. He reports current alcohol use of about 1.0 standard drinks of alcohol per week.      Family History: family history includes Breast Cancer in his father; Prostate Cancer in his brother and father. PHYSICAL EXAM:      BP (!) 141/59   Pulse 93   Temp 98.1 °F (36.7 °C) (Oral)   Resp 29   Ht 5' 8\" (1.727 m)   Wt 160 lb 0.9 oz (72.6 kg)   SpO2 92%   BMI 24.34 kg/m²      Intake/Output Summary (Last 24 hours) at 8/8/2020 1423  Last data filed at 8/7/2020 2054  Gross per 24 hour   Intake --   Output 175 ml   Net -175 ml           · Not seem due to COVID-19 Pandemic. Please refer to primary team note for physical examination finding          Labs:     CBC:   Recent Labs     08/07/20 0420 08/08/20 0338   WBC 9.9 12.1*   HGB 10.6* 11.3*   HCT 33.9* 35.5*   * 134*     BMP:   Recent Labs     08/07/20 0420 08/08/20 0338    140   K 4.5 4.7   CO2 16* 17*   BUN 53* 56*   CREATININE 1.15 1.36*   LABGLOM >60 49*   GLUCOSE 178* 179*     Pro-BNP:  No results for input(s): PROBNP in the last 72 hours. BNP: No results for input(s): BNP in the last 72 hours. PT/INR:   Recent Labs     08/06/20 0358   PROTIME 11.4   INR 1.1     APTT:  Recent Labs     08/06/20 0358   APTT 26.1     CARDIAC ENZYMES:No results for input(s): CKTOTAL, CKMB, CKMBINDEX, TROPONINI in the last 72 hours. Invalid input(s):  [address]  Recent Labs     08/07/20 2055 08/08/20 0338   TROPONINT NOT REPORTED NOT REPORTED       FASTING LIPID PANEL:No results found for: HDL, LDLDIRECT, LDLCALC, TRIG  LIVER PROFILE:  Recent Labs     08/06/20  0657 08/07/20 0420   AST 63* 49*   ALT 34 33   LABALBU 3.2* 3.2*         Patient's Active Problem List  Principal Problem:    Acute respiratory failure due to COVID-19  Active Problems:    Acute respiratory failure with hypoxia (HCC)    Pneumonia due to COVID-19 virus    MDS (myelodysplastic syndrome) ([address]    Insulin-requiring or dependent type II diabetes mellitus ([facility]    Hyperlipidemia    Essential hypertension    Aortic stenosis  Resolved Problems:    * No resolved hospital problems.

## 2020-08-08 NOTE — PROGRESS NOTES
[facility]    Progress Note    8/8/2020    4:11 PM    Name:   Alvina Sotelo  MRN:     [id number]     [place]:      [de-identified]   Room:   [facility] Day:  3  Admit Date:  8/5/2020  2:22 PM    PCP:   Shellee Litten  Code Status:  DNR-CCA    Subjective:     C/C: Shortness of breath    Interval History Status: improved. Patient seen and examined this afternoon. No acute events overnight. Clinically, he remains unchanged. Sitting up in chair at bedside. Remains on nonrebreather mask. Reports that his breathing is essentially unchanged from admission. Reports that his appetite is fair. Questioning when he is going to go home. Noted to be in ventricular trigeminy overnight. Brief History:     Per my associate: At 5 together grossly the second is wondering if there are some notes like you get interrupted with talking pages about this patient's ongoing Vivien Sides is a 80 y.o. male who presents with shortness of breath. He was directly admitted from [facility] ED Aug 5 for the management of Acute respiratory failure due to COVID-19.     The patient was admitted to [facility] last week after having a syncopal event. He believes this was attributed to his b/p being low. During that stay he was diagnosed with MDS. Today the pt was being evaluated by hematology when he reported worsening shortness of breath. Oxygen sats were noted to be in low 80's. Pt was sent to ED where he tested + for covid. CXR showed patchy infiltrates. Pt was started on 4L/nc but required non rebreather prior to transfer. He also received Decadron, Zithromax and Duoneb.       The hospitalist service did not feel comfortable admitting him at [facility]. He is a DNRCC-A and does not want CPR or intubation.   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      Pertinent Diagnostics on Admit:  WBC 9.6, hgb 11.3, platelets 949, lymphocytes 8, Glucose 179, BUN/Creat 76/2.43, ALT 36, AST 74, ALP 76, T bili 1.5, Troponin 17, D dimer 1.75, INR 1.16,  (0-100)     ABG's:  7.44 / 48 / 21 / 14.2 / -8 on 36% O2     Resp Viral panel:  + Covid 19     CXR:  Faint small scattered hazy opacities bi-basilar    Review of Systems:     Constitutional:  negative for chills, fevers, sweats  Respiratory: Reports shortness of breath which is unchanged from admission; reports cough; negative for wheezing  Cardiovascular:  negative for chest pain, chest pressure/discomfort, lower extremity edema, palpitations  Gastrointestinal:  negative for abdominal pain, constipation, diarrhea, nausea, vomiting  Neurological:  negative for dizziness, headache  MSK: reports neck pain/neck stiffness    Medications: Allergies:     Allergies   Allergen Reactions    Lovaza [Omega-3-Acid Ethyl Esters] Anaphylaxis    Metformin And Related Anaphylaxis       Current Meds:   Scheduled Meds:    enoxaparin  30 mg Subcutaneous BID    dexamethasone  6 mg Oral Daily    insulin glargine  30 Units Subcutaneous Nightly    sodium chloride  20 mL Intravenous Once    amLODIPine  5 mg Oral Daily    sodium chloride flush  10 mL Intravenous 2 times per day    insulin lispro  0-12 Units Subcutaneous TID WC    insulin lispro  0-6 Units Subcutaneous Nightly    metoprolol tartrate  50 mg Oral BID    calcium citrate  950 mg Oral Daily    therapeutic multivitamin-minerals  1 tablet Oral Daily    pantoprazole  40 mg Oral QAM AC    remdesivir IVPB  100 mg Intravenous Q24H     Continuous Infusions:    dextrose       PRN Meds: baclofen, glucose, dextrose, glucagon (rDNA), dextrose, sodium chloride flush, potassium chloride **OR** potassium alternative oral replacement **OR** potassium chloride, magnesium sulfate, acetaminophen **OR** acetaminophen, polyethylene glycol, promethazine **OR** ondansetron, acetaminophen, traMADol, sodium chloride    Data:     Past Medical History:   has a past medical history of

## 2020-08-08 NOTE — PROGRESS NOTES
[facility] [address] [facility] Patient  Today's Date and Time: 8/8/2020, 9:03 AM    Impression :   · COVID 19 Suspect  · COVID 19 Confirmed Infection  · COVID tested [facility] on 8/5/2020 positive  · High Inflammatory markers  · BABAK on CKD  · DM insulin dependent  · HTN  · Myelodysplastic Syndrome  · Chronic back pain    Recommendations:   · Diuresis  · Started Remdesivir 8/5-8/9  · Started Decadron 8/5-8/10  · Received immune plasma on 8-6-20. · CXR 8-6-20 shows worsening of infiltrate. Suggest diuresis    Medical Decision Making/Summary/Discussion:8/8/2020     · Patient admitted with suspected COVID 19 infection  · Covid test confirmed positive. · Multiple co morbidities. Pt with hypoxia, requiring 15L NRB and BABAK  · [facility] for further care  · Plan to start Remdesivir 8/5-8/9  · Start Decadron 6mg IV daily 8/5-8/10  · Patient has agreed to convalescent plasma  · Received immune plasma on 8-6-20 without any complications  · Pt is clear that he is a DRN CCA - no intubation or chest compressions   Infection Control Recommendations   · Universal Precautions  · Airborne isolation  · Droplet Isolation    Antimicrobial Stewardship Recommendations     · Simplification of therapy  · Targeted therapy    Coordination of Outpatient Care:   · Estimated Length of IV antimicrobials:8/5-8/9  · Patient will need Midline Catheter Insertion: TBD  · Patient will need PICC line Insertion: No  · Patient will need: Home IV , [place],  SNF,  LTAC:TBD  · Patient will need outpatient wound care:No    Chief complaint/reason for consultation:   · Concern for COVID infection      History of Present Illness:   Janay Oneill is a 80y.o.-year-old  male who was initially admitted on 8/5/2020.  Patient seen at the request of Dr. Shantell Sylvester:    Patient is an 81 yo gentleman with a PMH of DM II - insulin dependent, HTN, CKD and a new diagnosis of myelodysplastic syndrome. He was hospitalized and [facility] from July 28-30 after falling at home. Today (8-5) he was at his Hematologists office and reported that he was SOB. His SaO2 was in the low 80's and he was transported to the ED. Per the notes, a CXR showed patchy infiltrates, pt was tachycardic with SaO2 85% on RA. Labs showed  Cr 2.47  D-dimer 1.75  [id number]    AST 74  ALT 36    Viral PCR negative  COVID positive    Pt was transferred to [facility] for further care. He is AAO, reporting that he has been feeling very weak at home. No SOB, but nausea and diarrhea. Poor appetite, little PO intake. He states that he fell on 8-4-20 due to his weakness. Pt denies fevers or chills, no cough, no sputum production    He is clear that he does not wish to be intubated or have chest compressions. He does wish full treatment until either of those is necessary. CURRENT EVALUATION :8/8/2020    Patient evaluated and examined in the ICU. Pt is AAO  Improved from 15 L/min to 6 L/min NC and then back to 15 L/min nonrebreather   Ttachycardic 130's-->94-->102  Had trigeminy on the monitor. Afebrile    Patient exhibiting respiratory distress Yes  Respiratory secretions: No    Patient receiving supplemental oxygen. 15L NRB-->6L min-->15L NRB  02 sat  99-->95-->98%  RR 24-->31      QTc: 486    (Definition of High Risk Comorbid Conditions: Asthma, COPD, Heart Failure of any cause, DM, Hematologic malignancy, Immunocompromised taking >20 mg/day Prednisone). Baseline Number of High Risk Comorbid conditions:    5  Respiratory Support Level Score: (Room Air= 1 ;Supplemental 02 1L to 15 L/min= 2; Intubation and mechanical Ventilation = 3; Failure to support Ventilatory needs resulting in death =4)   2    NEWS Score: 0-4 Low risk group; 5-6: Medium risk group; 7 or above: High risk group  Parameters 3 2 1 0 1 2 3   Age    < 65   ? 65   RR ? 8  9-11 12-20  21-24 ?  25   O2 Sats ? 91 92-93 94-95 ? 96      Suppl O2  Yes  No      SBP ? 90  101-110 111-219   ? 220   HR ? 40  41-50 51-90  111-130 ? 131   Consciousness    Alert   Drowsiness, lethargy, or confusion   Temperature ? 35.0 C (95.0 F)  35.1-36.0 C 95.1-96.9 F 36.1-38.0 C 97.0-100.4 F 38.1-39.0 C 100.5-102.3 F ? 39.1 C ? 102.4 F      NEWS Score:   8-5-20: 12 high risk    Overall Daily Picture:    Slight worsening of chest x-ray 8-6-20    Presence of secondary bacterial Infection:  No   Additional antibiotics: None     Labs, X rays reviewed: 8/8/2020    BUN: 76-->63-->56  Cr: 2.43-->1.65-->1.15-->1.36  D dimer 1.75    WBC: 9.6-->6.5-->9.9-->12.1  Hb: 11.3-->9.9-->10.6-->11.3  Plat: 164-->111-->116-->134    Absolute Neutrophils:5.59  Absolute Lymphocytes:0.52  Neutrophil/Lymphocyte Ratio: 10.7 high risk    CRP: 140.9  Ferritin: 7,343-->5676  LDH: 803    Pro Calcitonin:0.32    Cultures:  Urine:  ·   Blood:  ·   Sputum :  ·   Wound:       CXR:   · 8/6/2020: Bilateral parenchymal abnormalities, superimposed vascular congestion and bibasilar atelectasis. Small left pleural effusion  CAT:    Discussed with patient, RN, IM.    8/6/2020    I have personally reviewed the past medical history, past surgical history, medications, social history, and family history, and I have updated the database accordingly.   Past Medical History:     Past Medical History:   Diagnosis Date    Aortic stenosis     Essential hypertension     Hyperlipidemia     Insulin-requiring or dependent type II diabetes mellitus ([facility]     MDS (myelodysplastic syndrome) ([facility]     Patient in clinical research study 08/06/2020    Convalescent plasma Expected date of completion 9/6/2020       Past Surgical  History:     Past Surgical History:   Procedure Laterality Date    APPENDECTOMY      COLECTOMY      LUMBAR LAMINECTOMY  2008    OTHER SURGICAL HISTORY  02/05/2020    Right shoulder joint injection and right L4/L5 TFESI    OTHER SURGICAL HISTORY Right 10/09/2019    Right L4 and 5 TFESI    PARATHYROIDECTOMY         Medications:      bumetanide  1 mg Intravenous Daily    enoxaparin  30 mg Subcutaneous BID    dexamethasone  6 mg Oral Daily    insulin glargine  30 Units Subcutaneous Nightly    sodium chloride  20 mL Intravenous Once    amLODIPine  5 mg Oral Daily    sodium chloride flush  10 mL Intravenous 2 times per day    insulin lispro  0-12 Units Subcutaneous TID WC    insulin lispro  0-6 Units Subcutaneous Nightly    metoprolol tartrate  50 mg Oral BID    calcium citrate  950 mg Oral Daily    therapeutic multivitamin-minerals  1 tablet Oral Daily    pantoprazole  40 mg Oral QAM AC    remdesivir IVPB  100 mg Intravenous Q24H       Social History:     Social History     Socioeconomic History    Marital status:      Spouse name: Not on file    Number of children: Not on file    Years of education: Not on file    Highest education level: Not on file   Occupational History    Not on file   Social Needs    Financial resource strain: Not on file    Food insecurity     Worry: Not on file     Inability: Not on file    Transportation needs     Medical: Not on file     Non-medical: Not on file   Tobacco Use    Smoking status: Former Smoker     Types: Cigarettes     Start date: 1967     Last attempt to quit: 2017     Years since quitting: 3.6   Substance and Sexual Activity    Alcohol use:  Yes     Alcohol/week: 1.0 standard drinks     Types: 1 Glasses of wine per week     Frequency: Monthly or less     Drinks per session: 1 or 2    Drug use: Not on file    Sexual activity: Not on file   Lifestyle    Physical activity     Days per week: Not on file     Minutes per session: Not on file    Stress: Not on file   Relationships    Social connections     Talks on phone: Not on file     Gets together: Not on file     Attends [employer] service: Not on file     Active member of club or organization: Not on file     Attends meetings of clubs or organizations: Not on file     Relationship status: Not on file    Intimate partner violence     Fear of current or ex partner: Not on file     Emotionally abused: Not on file     Physically abused: Not on file     Forced sexual activity: Not on file   Other Topics Concern    Not on file   Social History Narrative    Not on file       Family History:     Family History   Problem Relation Age of Onset    Breast Cancer Father     Prostate Cancer Father     Prostate Cancer Brother         Allergies:   Lovaza [omega-3-acid ethyl esters] and Metformin and related     Review of Systems:     Constitutional: No fevers or chills. Generalized malaise, weakness and fatigue  Head: No headaches  Eyes: No double vision or blurry vision. No conjunctival inflammation. ENT: No sore throat or runny nose. . No hearing loss, tinnitus or vertigo. Cardiovascular: No chest pain or palpitations. No shortness of breath. No SANTIAGO  Lung: No shortness of breath or cough. No sputum production  Abdomen:  nausea, diarrhea, no vomiting or abdominal pain. [place] No cramps. Genitourinary: No increased urinary frequency, or dysuria. No hematuria. No suprapubic or CVA pain  Musculoskeletal: Chronic back pain  Hematologic: Rt toes with ecchymosis. Pt able to wriggle all toes without pain  Neurologic: No headache, weakness, numbness, or tingling. Integument: No rash, no ulcers. Psychiatric: No depression. Endocrine: No polyuria, no polydipsia, no polyphagia. Physical Examination :     Patient Vitals for the past 8 hrs:   BP Temp Temp src Pulse Resp SpO2   08/08/20 0832 (!) 141/59 -- -- 93 -- --   08/08/20 0332 (!) 134/55 98.1 °F (36.7 °C) Oral 83 29 92 %     General Appearance: Awake, alert, and in distress  Head:  Normocephalic, no trauma  Eyes: Pupils equal, round, reactive to light and accommodation; extraocular movements intact; sclera anicteric; conjunctivae pink. No embolic phenomena.   ENT: Oropharynx clear, without erythema, exudate, or thrush. No tenderness of sinuses. Mouth/throat: mucosa pink and moist. No lesions. Dentition in good repair. Neck:Supple, without lymphadenopathy. Thyroid normal, No bruits. Pulmonary/Chest: Clear to auscultation, generally diminished throughout without wheezes, rales, or rhonchi. No dullness to percussion. Cardiovascular: Regular tachcardic rate and rhythm   Abdomen: Soft, non tender. Bowel sounds normal. No organomegaly  All four Extremities: Bilateral mild edema  Neurologic: No gross sensory or motor deficits. Skin: Warm and dry with good turgor. signs of peripheral arterial  insufficiency. No ulcerations. No open wounds. Medical Decision Making -Laboratory:   I have independently reviewed/ordered the following labs:    CBC with Differential:   Recent Labs     08/07/20 0420 08/08/20  0338   WBC 9.9 12.1*   HGB 10.6* 11.3*   HCT 33.9* 35.5*   * 134*   LYMPHOPCT 7* 6*   MONOPCT 5 6     BMP:   Recent Labs     08/06/20  0657 08/07/20  0420 08/07/20 2055 08/08/20  0338    142  --  140   K 4.7 4.5  --  4.7   * 109*  --  107   CO2 16* 16*  --  17*   BUN 59* 53*  --  56*   CREATININE 1.22* 1.15  --  1.36*   MG 2.4  --  2.5  --      Hepatic Function Panel:   Recent Labs     08/06/20 0657 08/07/20  0420   PROT 6.1* 6.1*   LABALBU 3.2* 3.2*   BILITOT 1.06 1.00   ALKPHOS 91 94   ALT 34 33   AST 63* 49*     No results for input(s): RPR in the last 72 hours. No results for input(s): HIV in the last 72 hours. No results for input(s): BC in the last 72 hours.   Lab Results   Component Value Date    RBC 3.65 08/08/2020    WBC 12.1 08/08/2020     Lab Results   Component Value Date    CREATININE 1.36 08/08/2020    GLUCOSE 179 08/08/2020       Medical Decision Making-Imaging:     EXAMINATION:    ONE XRAY VIEW OF THE CHEST         8/6/2020 2:56 pm         COMPARISON:    AP chest from 08/05/2020         HISTORY:    ORDERING SYSTEM PROVIDED HISTORY: COVID 19    TECHNOLOGIST PROVIDED HISTORY:    COVID 19 Acuity: Unknown    Type of Exam: Unknown         Additional history of diabetes and hypertension.         FINDINGS:    Overlying ECG monitor leads and gown snaps.         Mildly enlarged cardiac silhouette with a left ventricular configuration. Mediastinal structures midline unchanged, again with heavy calcification and    elongation thoracic aorta.         Diffuse airspace and interstitial abnormalities, denser in the lower lung    zones, slightly greater on the left, compatible with a history.  Some    superimposed vascular congestion and bibasilar atelectasis likely.  Possible    small left pleural effusion.  No large effusion.  No pneumothorax.         Bones appear unchanged.              Impression    Bilateral parenchymal abnormalities, compatible with the history and perhaps    some superimposed vascular congestion and bibasilar atelectasis.  Possible    tiny left pleural effusion. Medical Decision Wzjoix-Xgmbldch-Dvoap:       Medical Decision Making-Other:     Note:  · Labs, medications, radiologic studies were reviewed with personal review of films  · Moderate Large amounts of data were reviewed  · Discussed with nursing Staff, Discharge planner  · Infection Control and Prevention measures reviewed  · All prior entries were reviewed  · Administer medications as ordered  · Prognosis: Guarded  · Discharge planning reviewed  · Follow up as outpatient. Thank you for allowing us to participate in the care of this patient. Please call with questions.     Guilherme Leone MD  Pager: [phone] - Office: [phone]

## 2020-08-09 PROBLEM — N18.30 CKD (CHRONIC KIDNEY DISEASE), STAGE III (HCC): Status: ACTIVE | Noted: 2020-01-01

## 2020-08-09 NOTE — PROGRESS NOTES
[facility] [address] [facility]    Progress Note    8/9/2020    9:10 AM    Name:   Kianna Mccollum  MRN:     [id number]     [place]:      [de-identified]   Room:   [facility] Day:  4  Admit Date:  8/5/2020  2:22 PM    PCP:   Ashwin Beckford  Code Status:  DNR-CCA    Subjective:     C/C: Shortness of breath    Interval History Status: improved. Patient seen and examined this morning. Overnight, patient with periods of agitation and removing his non-rebreather mask. He is quite oriented to person, place and time. However, he has been more restless. Renal function has been stable/slightly uptrending. Glucoses elevated. Brief History:     Per my associate: At 5 together grossly the second is wondering if there are some notes like you get interrupted with talking pages about this patient's ongoing Son Jaeger is a 80 y.o. male who presents with shortness of breath. He was directly admitted from [facility] ED Aug 5 for the management of Acute respiratory failure due to COVID-19.     The patient was admitted to [facility] last week after having a syncopal event. He believes this was attributed to his b/p being low. During that stay he was diagnosed with MDS. Today the pt was being evaluated by hematology when he reported worsening shortness of breath. Oxygen sats were noted to be in low 80's. Pt was sent to ED where he tested + for covid. CXR showed patchy infiltrates. Pt was started on 4L/nc but required non rebreather prior to transfer. He also received Decadron, Zithromax and Duoneb.       The hospitalist service did not feel comfortable admitting him at [facility]. He is a DNRCC-A and does not want CPR or intubation.   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      Pertinent Diagnostics on Admit:  WBC 9.6, hgb 11.3, platelets 400, lymphocytes 8, Glucose 179, BUN/Creat 76/2.43, ALT 36, AST 74, ALP 76, T bili 1.5, Troponin 17, D dimer 1.75, INR dependent type II diabetes mellitus ([facility] MDS (myelodysplastic syndrome) ([facility] and Patient in clinical research study. Social History:   reports that he quit smoking about 3 years ago. His smoking use included cigarettes. He started smoking about 53 years ago. He does not have any smokeless tobacco history on file. He reports current alcohol use of about 1.0 standard drinks of alcohol per week. Family History:   Family History   Problem Relation Age of Onset    Breast Cancer Father     Prostate Cancer Father     Prostate Cancer Brother        Vitals:  /61   Pulse 85   Temp 97 °F (36.1 °C) (Axillary)   Resp (!) 33   Ht 5' 8\" (1.727 m)   Wt 160 lb 0.9 oz (72.6 kg)   SpO2 (!) 87%   BMI 24.34 kg/m²   Temp (24hrs), Av.3 °F (36.3 °C), Min:97 °F (36.1 °C), Max:97.6 °F (36.4 °C)    Recent Labs     20  1210 20  0812   POCGLU 160* 177* 255* 152*       I/O (24Hr):     Intake/Output Summary (Last 24 hours) at 2020 0910  Last data filed at 2020 2145  Gross per 24 hour   Intake 60 ml   Output 25 ml   Net 35 ml       Labs:  Hematology:  Recent Labs     20  0512   WBC 9.9 12.1* 9.2   RBC 3.52* 3.65* 3.49*   HGB 10.6* 11.3* 10.8*   HCT 33.9* 35.5* 33.2*   MCV 96.3 97.3 95.1   MCH 30.1 31.0 30.9   MCHC 31.3 31.8 32.5   RDW 13.9 14.1 13.8   * 134* 98*   MPV 9.6 9.7 10.0   DDIMER  --  0.87  --      Chemistry:  Recent Labs     20  04220  0512     --  140 140   K 4.5  --  4.7 4.5   *  --  107 105   CO2 16*  --  17* 15*   GLUCOSE 178*  --  179* 201*   BUN 53*  --  56* 62*   CREATININE 1.15  --  1.36* 1.39*   MG  --  2.5  --   --    ANIONGAP 17  --  16 20*   LABGLOM >60  --  49* 48*   GFRAA >60  --  60* 58*   CALCIUM 8.7  --  8.7 8.7   TROPHS  --  27* 31* 31*   MYOGLOBIN  --  129* 103* 127*     Recent Labs     20  0420  20  6404 08/08/20  1210 08/08/20  1727 08/08/20 2053 08/09/20  0812   PROT 6.1*  --   --   --   --   --   --   --    LABALBU 3.2*  --   --   --   --   --   --   --    AST 49*  --   --   --   --   --   --   --    ALT 33  --   --   --   --   --   --   --    ALKPHOS 94  --   --   --   --   --   --   --    BILITOT 1.00  --   --   --   --   --   --   --    POCGLU  --    < > 185* 153* 160* 177* 255* 152*    < > = values in this interval not displayed. ABG:No results found for: POCPH, PHART, PH, POCPCO2, NWG3JNU, PCO2, POCPO2, PO2ART, PO2, POCHCO3, GXD4ZMD, HCO3, NBEA, PBEA, BEART, BE, THGBART, THB, SLV2WGN, DFYS8FDY, J1PZONRU, O2SAT, FIO2  No results found for: SPECIAL  No results found for: CULTURE    Radiology:  No results found. Physical Examination:        General appearance:  alert, cooperative and no distress, elderly  gentleman sitting up in bed  Mental Status:  oriented to person, place and time and normal affect  Lungs:  Poor air movement, diminished breath sounds, increased effort  Heart:  regular rate and rhythm, no murmur  Abdomen:  soft, nontender, nondistended, normal bowel sounds, no masses, hepatomegaly, splenomegaly  Extremities:  no edema, redness, tenderness in the calves  Skin:  no gross lesions, rashes, induration    Assessment:        Hospital Problems           Last Modified POA    * (Principal) Acute respiratory failure due to COVID-19 8/6/2020 Yes    Acute respiratory failure with hypoxia ([facility] [address] 8/6/2020 Yes    Pneumonia due to COVID-19 virus 8/6/2020 Yes    MDS (myelodysplastic syndrome) ([facility] 8/6/2020 Yes    Insulin-requiring or dependent type II diabetes mellitus ([facility] 8/6/2020 Yes    Hyperlipidemia 8/6/2020 Yes    Essential hypertension 8/6/2020 Yes    CKD (chronic kidney disease), stage III ([facility] 8/9/2020 Yes    Aortic stenosis 8/5/2020 Yes          Plan:        1. Appreciate infectious disease and cardiology's input. 2. Agitation overnight.  Will discuss starting HFNC with RT today  3. Per cardiology, repeat 2D echo in the near future  4. Patient started on remdesivir (8/5/2020 - 8/9/2020) and received convalescent plasma on 8/6.  5. Patient started on Decadron through 8/10  6. COVID-19 positive, maintain droplet plus precautions, maintain SPO2 greater than 90%. 7. Renal function has been relatively stable; continue diuresis  8. Monitor blood glucoses. Maintain insulin sliding scale for coverage  9. Continue to hold lisinopril in setting of BABAK/CKD. Monitor blood pressure. BP has been controlled off of lisinopril  10. Continue metoprolol which is a substitute with the pharmacy for bisoprolol  11. Continue amlodipine  12. Continue Lantus to 30 units nightly while on Decadron; glucoses are mildly elevated  13. continue daily labs  14. DVT ppx. GI ppx  15.  Prognosis guarded; discussed with daughter Antonia Latham,   8/9/2020  9:10 AM

## 2020-08-09 NOTE — PROGRESS NOTES
[facility] [address] [facility] Patient  Today's Date and Time: 8/9/2020, 10:07 AM    Impression :   · COVID 19 Suspect  · COVID 19 Confirmed Infection  · COVID tested [facility] on 8/5/2020 positive  · High Inflammatory markers  · BABAK on CKD  · DM insulin dependent  · HTN  · Myelodysplastic Syndrome  · Chronic back pain    Recommendations:   · Diuresis  · Started Remdesivir 8/5-->8/9  · Started Decadron 8/5-8/10  · Received immune plasma on 8-6-20. · CXR 8-6-20 shows worsening of infiltrate. Suggest diuresis    Medical Decision Making/Summary/Discussion:8/9/2020     · Patient admitted with suspected COVID 19 infection  · Covid test confirmed positive. · Multiple co morbidities. Pt with hypoxia, requiring 15L NRB and BABAK  · Transferred from [facility] to [address] [facility] for further care  · Plan to start Remdesivir 8/5-8/9  · Start Decadron 6mg IV daily 8/5-8/10  · Patient has agreed to convalescent plasma  · Received immune plasma on 8-6-20 without any complications  · Pt is clear that he is a DRN CCA - no intubation or chest compressions   · 8-9-20 Drop in 02 sat. Suggest diuresis as tolerated  Infection Control Recommendations   · Universal Precautions  · Airborne isolation  · Droplet Isolation    Antimicrobial Stewardship Recommendations     · Simplification of therapy  · Targeted therapy    Coordination of Outpatient Care:   · Estimated Length of IV antimicrobials:8/5-8/9  · Patient will need Midline Catheter Insertion: TBD  · Patient will need PICC line Insertion: No  · Patient will need: Home IV , [place],  SNF,  LTAC:TBD  · Patient will need outpatient wound care:No    Chief complaint/reason for consultation:   · Concern for COVID infection      History of Present Illness:   Blake Pedroza is a 80y.o.-year-old  male who was initially admitted on 8/5/2020.  Patient seen at the request of Dr. Kenia Escobedo:    Patient is an 81 yo gentleman with a PMH of DM II - insulin dependent, HTN, CKD and a new diagnosis of myelodysplastic syndrome. He was hospitalized and [facility] from July 28-30 after falling at home. Today (8-5) he was at his Hematologists office and reported that he was SOB. His SaO2 was in the low 80's and he was transported to the ED. Per the notes, a CXR showed patchy infiltrates, pt was tachycardic with SaO2 85% on RA. Labs showed  Cr 2.47  D-dimer 1.75  [id number]    AST 74  ALT 36    Viral PCR negative  COVID positive    Pt was transferred to [facility] for further care. He is AAO, reporting that he has been feeling very weak at home. No SOB, but nausea and diarrhea. Poor appetite, little PO intake. He states that he fell on 8-4-20 due to his weakness. Pt denies fevers or chills, no cough, no sputum production    He is clear that he does not wish to be intubated or have chest compressions. He does wish full treatment until either of those is necessary. CURRENT EVALUATION :8/9/2020    Patient evaluated and examined in the ICU. Pt is AAO  Improved from 15 L/min to 6 L/min NC and then back to 15 L/min nonrebreather   Periods of agitation during the night  Ttachycardic 130's-->94-->102-->85    Afebrile    Patient exhibiting respiratory distress Yes  Respiratory secretions: No    Patient receiving supplemental oxygen. 15L NRB-->6L min-->15L NRB  02 sat  99-->95-->98-->87%  RR 24-->32      QTc: 486    (Definition of High Risk Comorbid Conditions: Asthma, COPD, Heart Failure of any cause, DM, Hematologic malignancy, Immunocompromised taking >20 mg/day Prednisone).     Baseline Number of High Risk Comorbid conditions:    5  Respiratory Support Level Score: (Room Air= 1 ;Supplemental 02 1L to 15 L/min= 2; Intubation and mechanical Ventilation = 3; Failure to support Ventilatory needs resulting in death =4)   2    NEWS Score: 0-4 Low risk group; 5-6: Medium risk group; 7 or above: High risk group  Parameters 3 2 1 0 1 2 3   Age    < 72   ? 72   RR ? 8  9-11 12-20  21-24 ? 25   O2 Sats ? 91 92-93 94-95 ? 96      Suppl O2  Yes  No      SBP ? 90  101-110 111-219   ? 220   HR ? 40  41-50 51-90  111-130 ? 131   Consciousness    Alert   Drowsiness, lethargy, or confusion   Temperature ? 35.0 C (95.0 F)  35.1-36.0 C 95.1-96.9 F 36.1-38.0 C 97.0-100.4 F 38.1-39.0 C 100.5-102.3 F ? 39.1 C ? 102.4 F      NEWS Score:   8-5-20: 12 high risk    Overall Daily Picture:    Slight worsening     Presence of secondary bacterial Infection:  No   Additional antibiotics: None     Labs, X rays reviewed: 8/9/2020    BUN: 76-->63-->56--^2  Cr: 2.43-->1.65-->1.15-->1.39  D dimer 1.75    WBC: 9.6-->9.9-->12.1-->9.2  Hb: 9.9-->10.6-->11.3-->10.8  Plat: 164-->111-->116-->134-->98    Absolute Neutrophils:5.59  Absolute Lymphocytes:0.52  Neutrophil/Lymphocyte Ratio: 10.7 high risk    CRP: 140.9  Ferritin: 7,343-->5676  LDH: 803    Pro Calcitonin:0.32    Cultures:  Urine:  ·   Blood:  ·   Sputum :  ·   Wound:       CXR:   · 8/6/2020: Bilateral parenchymal abnormalities, superimposed vascular congestion and bibasilar atelectasis. Small left pleural effusion  CAT:    Discussed with patient, RN, IM.        8/6/2020    I have personally reviewed the past medical history, past surgical history, medications, social history, and family history, and I have updated the database accordingly.   Past Medical History:     Past Medical History:   Diagnosis Date    Aortic stenosis     Essential hypertension     Hyperlipidemia     Insulin-requiring or dependent type II diabetes mellitus ([facility]     MDS (myelodysplastic syndrome) ([facility]     Patient in clinical research study 08/06/2020    Convalescent plasma Expected date of completion 9/6/2020       Past Surgical  History:     Past Surgical History:   Procedure Laterality Date    APPENDECTOMY      COLECTOMY      LUMBAR LAMINECTOMY  2008    OTHER SURGICAL HISTORY  02/05/2020    Right shoulder joint injection and right L4/L5 TFESI    OTHER SURGICAL HISTORY Right 10/09/2019    Right L4 and 5 TFESI    PARATHYROIDECTOMY         Medications:      enoxaparin  30 mg Subcutaneous BID    dexamethasone  6 mg Oral Daily    insulin glargine  30 Units Subcutaneous Nightly    sodium chloride  20 mL Intravenous Once    amLODIPine  5 mg Oral Daily    sodium chloride flush  10 mL Intravenous 2 times per day    insulin lispro  0-12 Units Subcutaneous TID WC    insulin lispro  0-6 Units Subcutaneous Nightly    metoprolol tartrate  50 mg Oral BID    calcium citrate  950 mg Oral Daily    therapeutic multivitamin-minerals  1 tablet Oral Daily    pantoprazole  40 mg Oral QAM AC    remdesivir IVPB  100 mg Intravenous Q24H       Social History:     Social History     Socioeconomic History    Marital status:      Spouse name: Not on file    Number of children: Not on file    Years of education: Not on file    Highest education level: Not on file   Occupational History    Not on file   Social Needs    Financial resource strain: Not on file    Food insecurity     Worry: Not on file     Inability: Not on file    Transportation needs     Medical: Not on file     Non-medical: Not on file   Tobacco Use    Smoking status: Former Smoker     Types: Cigarettes     Start date: 1967     Last attempt to quit: 2017     Years since quitting: 3.6   Substance and Sexual Activity    Alcohol use:  Yes     Alcohol/week: 1.0 standard drinks     Types: 1 Glasses of wine per week     Frequency: Monthly or less     Drinks per session: 1 or 2    Drug use: Not on file    Sexual activity: Not on file   Lifestyle    Physical activity     Days per week: Not on file     Minutes per session: Not on file    Stress: Not on file   Relationships    Social connections     Talks on phone: Not on file     Gets together: Not on file     Attends Zoroastrianism service: Not on file     Active member of club or organization: Not on file Attends meetings of clubs or organizations: Not on file     Relationship status: Not on file    Intimate partner violence     Fear of current or ex partner: Not on file     Emotionally abused: Not on file     Physically abused: Not on file     Forced sexual activity: Not on file   Other Topics Concern    Not on file   Social History Narrative    Not on file       Family History:     Family History   Problem Relation Age of Onset    Breast Cancer Father     Prostate Cancer Father     Prostate Cancer Brother         Allergies:   Lovaza [omega-3-acid ethyl esters] and Metformin and related     Review of Systems:     Constitutional: No fevers or chills. Generalized malaise, weakness and fatigue  Head: No headaches  Eyes: No double vision or blurry vision. No conjunctival inflammation. ENT: No sore throat or runny nose. . No hearing loss, tinnitus or vertigo. Cardiovascular: No chest pain or palpitations. No shortness of breath. No SANTIAGO  Lung: No shortness of breath or cough. No sputum production  Abdomen:  nausea, diarrhea, no vomiting or abdominal pain. Othelia Rudi No cramps. Genitourinary: No increased urinary frequency, or dysuria. No hematuria. No suprapubic or CVA pain  Musculoskeletal: Chronic back pain  Hematologic: Rt toes with ecchymosis. Pt able to wriggle all toes without pain  Neurologic: No headache, weakness, numbness, or tingling. Integument: No rash, no ulcers. Psychiatric: No depression. Endocrine: No polyuria, no polydipsia, no polyphagia. Physical Examination :     Patient Vitals for the past 8 hrs:   BP Temp Temp src Pulse Resp SpO2   08/09/20 0816 109/61 -- -- 85 -- --   08/09/20 0415 (!) 135/59 97 °F (36.1 °C) Axillary 84 (!) 33 (!) 87 %     General Appearance: Awake, alert, and in distress  Head:  Normocephalic, no trauma  Eyes: Pupils equal, round, reactive to light and accommodation; extraocular movements intact; sclera anicteric; conjunctivae pink. No embolic phenomena.   ENT: Oropharynx clear, without erythema, exudate, or thrush. No tenderness of sinuses. Mouth/throat: mucosa pink and moist. No lesions. Dentition in good repair. Neck:Supple, without lymphadenopathy. Thyroid normal, No bruits. Pulmonary/Chest: Clear to auscultation, generally diminished throughout without wheezes, rales, or rhonchi. No dullness to percussion. Cardiovascular: Regular tachcardic rate and rhythm   Abdomen: Soft, non tender. Bowel sounds normal. No organomegaly  All four Extremities: Bilateral mild edema  Neurologic: No gross sensory or motor deficits. Skin: Warm and dry with good turgor. signs of peripheral arterial  insufficiency. No ulcerations. No open wounds. Medical Decision Making -Laboratory:   I have independently reviewed/ordered the following labs:    CBC with Differential:   Recent Labs     08/08/20 0338 08/09/20 0512   WBC 12.1* 9.2   HGB 11.3* 10.8*   HCT 35.5* 33.2*   * 98*   LYMPHOPCT 6* 5*   MONOPCT 6 7     BMP:   Recent Labs     08/07/20 2055 08/08/20 0338 08/09/20 0512   NA  --  140 140   K  --  4.7 4.5   CL  --  107 105   CO2  --  17* 15*   BUN  --  56* 62*   CREATININE  --  1.36* 1.39*   MG 2.5  --   --      Hepatic Function Panel:   Recent Labs     08/07/20  0420   PROT 6.1*   LABALBU 3.2*   BILITOT 1.00   ALKPHOS 94   ALT 33   AST 49*     No results for input(s): RPR in the last 72 hours. No results for input(s): HIV in the last 72 hours. No results for input(s): BC in the last 72 hours.   Lab Results   Component Value Date    RBC 3.49 08/09/2020    WBC 9.2 08/09/2020     Lab Results   Component Value Date    CREATININE 1.39 08/09/2020    GLUCOSE 201 08/09/2020       Medical Decision Making-Imaging:     EXAMINATION:    ONE XRAY VIEW OF THE CHEST         8/6/2020 2:56 pm         COMPARISON:    AP chest from 08/05/2020         HISTORY:    ORDERING SYSTEM PROVIDED HISTORY: COVID 19    TECHNOLOGIST PROVIDED HISTORY:    COVID 19    Acuity: Unknown    Type of Exam: Unknown      Additional history of diabetes and hypertension.         FINDINGS:    Overlying ECG monitor leads and gown snaps.         Mildly enlarged cardiac silhouette with a left ventricular configuration. Mediastinal structures midline unchanged, again with heavy calcification and    elongation thoracic aorta.         Diffuse airspace and interstitial abnormalities, denser in the lower lung    zones, slightly greater on the left, compatible with a history.  Some    superimposed vascular congestion and bibasilar atelectasis likely.  Possible    small left pleural effusion.  No large effusion.  No pneumothorax.         Bones appear unchanged.              Impression    Bilateral parenchymal abnormalities, compatible with the history and perhaps    some superimposed vascular congestion and bibasilar atelectasis.  Possible    tiny left pleural effusion. Medical Decision [id number]:       Medical Decision Making-Other:     Note:  · Labs, medications, radiologic studies were reviewed with personal review of films  · Moderate Large amounts of data were reviewed  · Discussed with nursing Staff, Discharge planner  · Infection Control and Prevention measures reviewed  · All prior entries were reviewed  · Administer medications as ordered  · Prognosis: Guarded  · Discharge planning reviewed  · Follow up as outpatient. Thank you for allowing us to participate in the care of this patient. Please call with questions.     Becky Cortes MD  Pager: [phone] - Office: [phone]

## 2020-08-09 NOTE — PROGRESS NOTES
Pt noted to have spO2 in the 60's. Upon entering room, pt found to have nonrebreather off. Pt sating low-mid 90's once new NRB placed. Respiratory at bedside. Pt repositioned with HOB up. Pt appears more restless than initial assessment, despite receiving a one time dose of ativan. Writer spoke with RT who agreed to have bipap on standby if necessary. Pt currently sating above 90 percent. Will continue to monitor.

## 2020-08-10 PROBLEM — R41.0 DELIRIUM: Status: ACTIVE | Noted: 2020-01-01

## 2020-08-10 NOTE — PLAN OF CARE
Problem: Falls - Risk of:  Goal: Will remain free from falls  Description: Will remain free from falls  Outcome: Ongoing  Goal: Absence of physical injury  Description: Absence of physical injury  Outcome: Ongoing     Problem: Nutrition Deficit:  Goal: Ability to achieve adequate nutritional intake will improve  Description: Ability to achieve adequate nutritional intake will improve  Outcome: Ongoing     Problem: SAFETY  Goal: Free from accidental physical injury  Outcome: Ongoing  Goal: Free from intentional harm  Outcome: Ongoing     Problem: DAILY CARE  Goal: Daily care needs are met  Outcome: Ongoing     Problem: PAIN  Goal: Patient's pain/discomfort is manageable  Outcome: Ongoing     Problem: Musculor/Skeletal Functional Status  Goal: Highest potential functional level  Outcome: Ongoing  Goal: Absence of falls  Outcome: Ongoing     Problem: Skin Integrity:  Goal: Will show no infection signs and symptoms  Description: Will show no infection signs and symptoms  Outcome: Ongoing  Goal: Absence of new skin breakdown  Description: Absence of new skin breakdown  Outcome: Ongoing     Problem: Restraint Use - Nonviolent/Non-Self-Destructive Behavior:  Goal: Absence of restraint indications  Description: Absence of restraint indications  Outcome: Ongoing  Goal: Absence of restraint-related injury  Description: Absence of restraint-related injury  Outcome: Ongoing

## 2020-08-10 NOTE — PROGRESS NOTES
Writer performed bedside swallow study. Patient tolerated applesauce and sips of water with no complications. Will continue to monitor.

## 2020-08-10 NOTE — PROGRESS NOTES
Physical Therapy  DATE: 8/10/2020    NAME: Moy Otero  MRN: [id number]   : 1932    Patient not seen this date for Physical Therapy due to:  [] Blood transfusion in progress  [] Hemodialysis  []  Patient Declined  [] Spine Precautions   [] Strict Bedrest  [] Surgery/ Procedure  [x] Testing Per RN, pt leaving for CT. Will check back in the PM as time allows     [] Other        [] PT being discontinued at this time. Patient independent. No further needs. [] PT being discontinued at this time as the patient has been transferred to palliative care. No further needs.     Elli Camilo, PTA

## 2020-08-10 NOTE — PROGRESS NOTES
[facility]  Progress Note                   Date:   8/10/2020  Patient name: Fang Figueroa  Date of admission:  8/5/2020  2:22 PM  MRN:   [id number]  YOB: 1932  PCP: Nathaniel Daugherty    Reason for Admission: Pneumonia [J18.9]    Subjective:       Clinical Changes /Abnormalities:  Discussed patient with RN. D/t Covid 19 exam was deferred to primary. Episodes of tachycardia when upset per RN. SR on tele HR 98      Review of Systems    Medications:   Scheduled Meds:   sodium bicarbonate  650 mg Oral 4x Daily    bumetanide  1 mg Oral Daily    enoxaparin  30 mg Subcutaneous BID    dexamethasone  6 mg Oral Daily    insulin glargine  30 Units Subcutaneous Nightly    sodium chloride  20 mL Intravenous Once    amLODIPine  5 mg Oral Daily    sodium chloride flush  10 mL Intravenous 2 times per day    insulin lispro  0-12 Units Subcutaneous TID WC    insulin lispro  0-6 Units Subcutaneous Nightly    metoprolol tartrate  50 mg Oral BID    calcium citrate  950 mg Oral Daily    therapeutic multivitamin-minerals  1 tablet Oral Daily    pantoprazole  40 mg Oral QAM AC     Continuous Infusions:   dextrose       CBC:   Recent Labs     08/08/20  0338 08/09/20  0512 08/10/20  0348   WBC 12.1* 9.2 12.5*   HGB 11.3* 10.8* 11.8*   * 98* 113*     BMP:    Recent Labs     08/08/20 0338 08/09/20  0512 08/10/20  0348    140 141   K 4.7 4.5 3.9    105 106   CO2 17* 15* 18*   BUN 56* 62* 66*   CREATININE 1.36* 1.39* 1.48*   GLUCOSE 179* 201* 147*     Hepatic:No results for input(s): AST, ALT, ALB, BILITOT, ALKPHOS in the last 72 hours. Troponin:   Recent Labs     08/07/20 2055 08/08/20 0338 08/09/20  0512   TROPHS 27* 31* 31*     BNP: No results for input(s): BNP in the last 72 hours. Lipids: No results for input(s): CHOL, HDL in the last 72 hours. Invalid input(s): LDLCALCU  INR: No results for input(s): INR in the last 72 hours.     Objective:   Vitals: BP (!) 127/51 Pulse 75   Temp 97 °F (36.1 °C) (Axillary)   Resp 28   Ht 5' 8\" (1.727 m)   Wt 160 lb 0.9 oz (72.6 kg)   SpO2 96%   BMI 24.34 kg/m²     For careful stewardship of limited PPE during COVID-19 pandemic my physical exam was deferred. For physical exam, please see today's physical from primary team or ICU team.     Echo from [address] [place] [address] 2016 with LVEF >50%, mild LVH, mild aortic valve regurg, no AS      Assessment / Acute Cardiac Problems:   1. Acute resp failure secondary to COVID-19  2. Trigeminy on tele  3. IDDM  4. MDS  5. HTN  6. HLP      Patient Active Problem List:     Pneumonia     Acute respiratory failure due to COVID-19     MDS (myelodysplastic syndrome) ([facility] [address]     Insulin-requiring or dependent type II diabetes mellitus ([facility] [address] [facility]     Hyperlipidemia     Essential hypertension     Aortic stenosis     Elevated prostate specific antigen (PSA)     History of prostate cancer     Acute respiratory failure with hypoxia (HCC)     Pneumonia due to COVID-19 virus     CKD (chronic kidney disease), stage III ([facility] [address]      Plan of Treatment:   1. Stable. Continue to monitor e-lytes and keep K> 4 and Mg >2.  2. Nonspecific troponin elevation , likely type II MI from COVID-19 infection  3. HTN controlled  Continue CCB and BB. 4. PAT  Occasional episodes of tachycardia. Will add prn lopressor q 6 hours. 5. Echo once COVID negative. Pt. Is DNRCC-A and does not want intubation. 6. Creat continues to rise slowly.  Management per attenting    Electronically signed by SHADIA Chew NP on 8/10/2020 at 10:01 AM  [id number] [place] [address] Rd.  [phone]

## 2020-08-10 NOTE — PROGRESS NOTES
Pt's daughter, Andrew Ziegler, called and updated on pt's plan of care. All questions answered to satisfaction.

## 2020-08-10 NOTE — PROGRESS NOTES
Sindhu Real 19    Progress Note    8/10/2020    7:15 PM    Name:   Perez Stevenson  MRN:     [id number]     [place]:      [de-identified]   Room:   [facility] Day:  5  Admit Date:  8/5/2020  2:22 PM    PCP:   Sharon Bolaños  Code Status:  DNR-CCA    Subjective:     C/C: Shortness of breath    Interval History Status: improved. Patient seen and examined this morning. Overnight, patient continued to decline from a mentation standpoint. He continues to be confused and restless. Hypoxia persists. He was able to be transitioned to HFNC. He knows his name and what year it is. No abdominal pain. Brief History:     Per my associate: At 5 together grossly the second is wondering if there are some notes like you get interrupted with talking pages about this patient's ongoing Steven Fletcher is a 80 y.o. male who presents with shortness of breath. He was directly admitted from [facility] ED Aug 5 for the management of Acute respiratory failure due to COVID-19.     The patient was admitted to [facility] last week after having a syncopal event. He believes this was attributed to his b/p being low. During that stay he was diagnosed with MDS. Today the pt was being evaluated by hematology when he reported worsening shortness of breath. Oxygen sats were noted to be in low 80's. Pt was sent to ED where he tested + for covid. CXR showed patchy infiltrates. Pt was started on 4L/nc but required non rebreather prior to transfer. He also received Decadron, Zithromax and Duoneb.       The hospitalist service did not feel comfortable admitting him at [facility]. He is a DNRCC-A and does not want CPR or intubation.   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      Pertinent Diagnostics on Admit:  WBC 9.6, hgb 11.3, platelets 393, lymphocytes 8, Glucose 179, BUN/Creat 76/2.43, ALT 36, AST 74, ALP 76, T bili 1.5, Troponin 17, D dimer 1.75, INR 1.16, BNP History:   has a past medical history of Aortic stenosis, Essential hypertension, Hyperlipidemia, Insulin-requiring or dependent type II diabetes mellitus ([facility] [address] [address] MDS [facility]myelodysplastic syndrome) [facility] and Patient in clinical research study. Social History:   reports that he quit smoking about 3 years ago. His smoking use included cigarettes. He started smoking about 53 years ago. He does not have any smokeless tobacco history on file. He reports current alcohol use of about 1.0 standard drinks of alcohol per week. Family History:   Family History   Problem Relation Age of Onset    Breast Cancer Father     Prostate Cancer Father     Prostate Cancer Brother        Vitals:  /65   Pulse 78   Temp 96.8 °F (36 °C) (Axillary)   Resp 21   Ht 5' 8\" (1.727 m)   Wt 160 lb 0.9 oz (72.6 kg)   SpO2 96%   BMI 24.34 kg/m²   Temp (24hrs), Av.3 °F (36.3 °C), Min:96.8 °F (36 °C), Max:97.6 °F (36.4 °C)    Recent Labs     20  2031 08/10/20  0757 08/10/20  1155 08/10/20  1643   POCGLU 230* 129* 148* 195*       I/O (24Hr):     Intake/Output Summary (Last 24 hours) at 8/10/2020 1915  Last data filed at 8/10/2020 0430  Gross per 24 hour   Intake 20 ml   Output --   Net 20 ml       Labs:  Hematology:  Recent Labs     20  0338 20  0512 08/10/20  0348 08/10/20  0440   WBC 12.1* 9.2 12.5*  --    RBC 3.65* 3.49* 3.82*  --    HGB 11.3* 10.8* 11.8*  --    HCT 35.5* 33.2* 35.9*  --    MCV 97.3 95.1 94.0  --    MCH 31.0 30.9 30.9  --    MCHC 31.8 32.5 32.9  --    RDW 14.1 13.8 13.6  --    * 98* 113*  --    MPV 9.7 10.0 10.4  --    DDIMER 0.87  --   --  0.72     Chemistry:  Recent Labs     20  0338 20  0512 08/10/20  0348 08/10/20  0349   NA  --  140 140 141  --    K  --  4.7 4.5 3.9  --    CL  --  107 105 106  --    CO2  --  17* 15* 18*  --    GLUCOSE  --  179* 201* 147*  --    BUN  --  56* 62* 66*  --    CREATININE  --  1.36* 1.39* 1.48*  --    MG 2.5  --   --   --   -- ANIONGAP  --  16 20* 17  --    LABGLOM  --  49* 48* 45*  --    GFRAA  --  60* 58* 54*  --    CALCIUM  --  8.7 8.7 8.7  --    PROBNP  --   --   --  1,019*  --    TROPHS 27* 31* 31*  --   --    MYOGLOBIN 129* 103* 127*  --   --    LACTACIDWB  --   --   --   --  2.3*     Recent Labs     08/09/20  1202 08/09/20  1706 08/09/20  2031 08/10/20  0757 08/10/20  1155 08/10/20  1643   POCGLU 162* 173* 230* 129* 148* 195*     ABG:  Lab Results   Component Value Date    POCPH 7.469 08/10/2020    POCPCO2 27.0 08/10/2020    POCPO2 69.8 08/10/2020    POCHCO3 19.6 08/10/2020    NBEA 3 08/10/2020    PBEA NOT REPORTED 08/10/2020    OJE1GSZ 21 08/10/2020    IYTS7MMM 95 08/10/2020    FIO2 15.0 08/10/2020     No results found for: SPECIAL  No results found for: CULTURE    Radiology:  No results found. Physical Examination:        General appearance:  alert, cooperative and no distress, elderly  gentleman sitting up in bed, NRB present  Mental Status:  oriented to person, place and time and normal affect  Lungs:  Poor air movement, diminished breath sounds, increased effort  Heart:  regular rate and rhythm, no murmur  Abdomen:  soft, nontender, nondistended, normal bowel sounds, no masses, hepatomegaly, splenomegaly  Extremities:  no edema, redness, tenderness in the calves  Skin:  no gross lesions, rashes, induration    Assessment:        Hospital Problems           Last Modified POA    * (Principal) Acute respiratory failure due to COVID-19 8/6/2020 Yes    Acute respiratory failure with hypoxia ([facility] 8/6/2020 Yes    Pneumonia due to COVID-19 virus 8/6/2020 Yes    Delirium 8/10/2020 No    MDS (myelodysplastic syndrome) ([facility] [address] [facility] 8/6/2020 Yes    Insulin-requiring or dependent type II diabetes mellitus ([facility] [facility] 8/6/2020 Yes    Hyperlipidemia 8/6/2020 Yes    Essential hypertension 8/6/2020 Yes    CKD (chronic kidney disease), stage III ([facility] [address] 8/9/2020 Yes    Aortic stenosis 8/5/2020 Yes          Plan:        1.  Appreciate infectious disease and cardiology's input. 2. Agitation overnight. Transitioned to HFNC today  3. CXR concerning for free air. Clinically, nontender abdomen. KUB with no free air when upright  4. Per cardiology, repeat 2D echo in the near future  5. Patient started on remdesivir (8/5/2020 - 8/9/2020) and received convalescent plasma on 8/6.  6. Patient started on Decadron through 8/10  7. COVID-19 positive, maintain droplet plus precautions, maintain SPO2 greater than 90%. 8. Renal function has been relatively stable; continue diuresis  9. Monitor blood glucoses. Maintain insulin sliding scale for coverage  10. Continue to hold lisinopril in setting of BABAK/CKD. Monitor blood pressure. BP has been controlled off of lisinopril  11. Continue metoprolol which is a substitute with the pharmacy for bisoprolol  12. Continue amlodipine  13. Continue Lantus to 30 units nightly while on Decadron; glucoses are mildly elevated  14. continue daily labs  15. Start ultram for pain control; start seroquel for agitation  16. DVT ppx. GI ppx  17.  Prognosis guarded to poor; high risk for death    Ovi Martines,   8/10/2020  7:15 PM

## 2020-08-10 NOTE — PROGRESS NOTES
[facility] [address] [facility] Patient  Today's Date and Time: 8/10/2020, 11:38 AM    Impression :   · COVID 19 Suspect  · COVID 19 Confirmed Infection  · COVID tested [facility] on 8/5/2020 positive  · High Inflammatory markers  · BABAK on CKD  · DM insulin dependent  · HTN  · Myelodysplastic Syndrome  · Chronic back pain    Recommendations:   · Diuresis  · Started Remdesivir 8/5-->8/9  · Started Decadron 8/5-8/10  · Received immune plasma on 8-6-20. · CXR 8-6-20 shows worsening of infiltrate. Medical Decision Making/Summary/Discussion:8/10/2020     · Patient admitted with suspected COVID 19 infection  · Covid test confirmed positive. · Multiple co morbidities. Pt with hypoxia, requiring 15L NRB and BABAK  · Transferred [facility] for further care  · Plan to start Remdesivir 8/5-8/9  · Start Decadron 6mg IV daily 8/5-8/10  · Patient has agreed to convalescent plasma  · Received immune plasma on 8-6-20 without any complications  · Pt is clear that he is a DRN CCA - no intubation or chest compressions   · 8-9-20 Drop in 02 sat. Bumex started, with good response  Infection Control Recommendations   · Universal Precautions  · Airborne isolation  · Droplet Isolation    Antimicrobial Stewardship Recommendations     · Simplification of therapy  · Targeted therapy    Coordination of Outpatient Care:   · Estimated Length of IV antimicrobials:8/5-8/9  · Patient will need Midline Catheter Insertion: TBD  · Patient will need PICC line Insertion: No  · Patient will need: Home IV , [place],  SNF,  LTAC:TBD  · Patient will need outpatient wound care:No    Chief complaint/reason for consultation:   · Concern for COVID infection      History of Present Illness:   Sandra Ugarte is a 80y.o.-year-old  male who was initially admitted on 8/5/2020.  Patient seen at the request of Dr. Agatha Wheat:    Patient is an 81 yo gentleman with a PMH of DM II - insulin dependent, HTN, CKD and a new diagnosis of myelodysplastic syndrome. He was hospitalized and [facility] from July 28-30 after falling at home. Today (8-5) he was at his Hematologists office and reported that he was SOB. His SaO2 was in the low 80's and he was transported to the ED. Per the notes, a CXR showed patchy infiltrates, pt was tachycardic with SaO2 85% on RA. Labs showed  Cr 2.47  D-dimer 1.75  [id number]    AST 74  ALT 36    Viral PCR negative  COVID positive    Pt was transferred to [facility] for further care. He is AAO, reporting that he has been feeling very weak at home. No SOB, but nausea and diarrhea. Poor appetite, little PO intake. He states that he fell on 8-4-20 due to his weakness. Pt denies fevers or chills, no cough, no sputum production    He is clear that he does not wish to be intubated or have chest compressions. He does wish full treatment until either of those is necessary. CURRENT EVALUATION :8/10/2020    Patient evaluated and examined in the ICU. Pt is AAO, agitated  Now on high flow O2 30L 100%   Periods of agitation during the night  Tachycardia improved    Afebrile    Patient exhibiting respiratory distress Yes  Respiratory secretions: No    Patient receiving supplemental oxygen. 15L NRB-->6L min-->15L NRB-> high flow 30L 100%  02 sat  99-->95-->98-->87->92%  RR 24-->32->21      QTc: 486    (Definition of High Risk Comorbid Conditions: Asthma, COPD, Heart Failure of any cause, DM, Hematologic malignancy, Immunocompromised taking >20 mg/day Prednisone). Baseline Number of High Risk Comorbid conditions:    5  Respiratory Support Level Score: (Room Air= 1 ;Supplemental 02 1L to 15 L/min= 2; Intubation and mechanical Ventilation = 3; Failure to support Ventilatory needs resulting in death =4)   2    NEWS Score: 0-4 Low risk group; 5-6: Medium risk group; 7 or above: High risk group  Parameters 3 2 1 0 1 2 3   Age    < 72   ? 65   RR ? 8  9-11 12-20  21-24 ? 25   O2 Sats ? 91 92-93 94-95 ? 96      Suppl O2  Yes  No      SBP ? 90  101-110 111-219   ? 220   HR ? 40  41-50 51-90  111-130 ? 131   Consciousness    Alert   Drowsiness, lethargy, or confusion   Temperature ? 35.0 C (95.0 F)  35.1-36.0 C 95.1-96.9 F 36.1-38.0 C 97.0-100.4 F 38.1-39.0 C 100.5-102.3 F ? 39.1 C ? 102.4 F      NEWS Score:   8-5-20: 12 high risk    Overall Daily Picture:    Slight worsening     Presence of secondary bacterial Infection:  No   Additional antibiotics: None     Labs, X rays reviewed: 8/10/2020    BUN: 76-->63-->56--62->66  Cr: 2.43-->1.65-->1.15-->1.39->1.48  D dimer 1.75    Pro BNP 1019    WBC: 9.6-->9.9-->12.1-->9.2->12.5  Hb: 9.9-->10.6-->11.3-->10.8->11.8  Plat: 164-->111-->116-->134-->98->113    Absolute Neutrophils:5.59  Absolute Lymphocytes:0.52  Neutrophil/Lymphocyte Ratio: 10.7 high risk    CRP: 140.9  Ferritin: 7,343-->5676  LDH: 803    Pro Calcitonin:0.32    Cultures:  Urine:  ·   Blood:  ·   Sputum :  ·   Wound:       CXR:   · 8/6/2020: Bilateral parenchymal abnormalities, superimposed vascular congestion and bibasilar atelectasis. Small left pleural effusion  8-10: Increased subcutaneous air. Questionable free air within the upper abdomen. Partial clearing of bilateral lung opacities. ·   CAT:    Discussed with patient, RN, IM.    8-10      8/6/2020    I have personally reviewed the past medical history, past surgical history, medications, social history, and family history, and I have updated the database accordingly.   Past Medical History:     Past Medical History:   Diagnosis Date    Aortic stenosis     Essential hypertension     Hyperlipidemia     Insulin-requiring or dependent type II diabetes mellitus ([facility]     MDS (myelodysplastic syndrome) ([facility]     Patient in clinical research study 08/06/2020    Convalescent plasma Expected date of completion 9/6/2020       Past Surgical  History:     Past Surgical History:   Procedure Laterality Date    APPENDECTOMY      COLECTOMY      LUMBAR LAMINECTOMY  2008    OTHER SURGICAL HISTORY  02/05/2020    Right shoulder joint injection and right L4/L5 TFESI    OTHER SURGICAL HISTORY Right 10/09/2019    Right L4 and 5 TFESI    PARATHYROIDECTOMY         Medications:      sodium bicarbonate  650 mg Oral 4x Daily    bumetanide  1 mg Oral Daily    enoxaparin  30 mg Subcutaneous BID    dexamethasone  6 mg Oral Daily    insulin glargine  30 Units Subcutaneous Nightly    sodium chloride  20 mL Intravenous Once    amLODIPine  5 mg Oral Daily    sodium chloride flush  10 mL Intravenous 2 times per day    insulin lispro  0-12 Units Subcutaneous TID WC    insulin lispro  0-6 Units Subcutaneous Nightly    metoprolol tartrate  50 mg Oral BID    calcium citrate  950 mg Oral Daily    therapeutic multivitamin-minerals  1 tablet Oral Daily    pantoprazole  40 mg Oral QAM AC       Social History:     Social History     Socioeconomic History    Marital status:      Spouse name: Not on file    Number of children: Not on file    Years of education: Not on file    Highest education level: Not on file   Occupational History    Not on file   Social Needs    Financial resource strain: Not on file    Food insecurity     Worry: Not on file     Inability: Not on file    Transportation needs     Medical: Not on file     Non-medical: Not on file   Tobacco Use    Smoking status: Former Smoker     Types: Cigarettes     Start date: 1967     Last attempt to quit: 2017     Years since quitting: 3.6   Substance and Sexual Activity    Alcohol use:  Yes     Alcohol/week: 1.0 standard drinks     Types: 1 Glasses of wine per week     Frequency: Monthly or less     Drinks per session: 1 or 2    Drug use: Not on file    Sexual activity: Not on file   Lifestyle    Physical activity     Days per week: Not on file     Minutes per session: Not on file    Stress: Not on file Relationships    Social connections     Talks on phone: Not on file     Gets together: Not on file     Attends [employer] service: Not on file     Active member of club or organization: Not on file     Attends meetings of clubs or organizations: Not on file     Relationship status: Not on file    Intimate partner violence     Fear of current or ex partner: Not on file     Emotionally abused: Not on file     Physically abused: Not on file     Forced sexual activity: Not on file   Other Topics Concern    Not on file   Social History Narrative    Not on file       Family History:     Family History   Problem Relation Age of Onset    Breast Cancer Father     Prostate Cancer Father     Prostate Cancer Brother         Allergies:   Lovaza [omega-3-acid ethyl esters] and Metformin and related     Review of Systems:     Constitutional: No fevers or chills. Sleepy today, reports breathing is 'better'  Head: No headaches  Eyes: No double vision or blurry vision. No conjunctival inflammation. ENT: No sore throat or runny nose. . No hearing loss, tinnitus or vertigo. Cardiovascular: No chest pain or palpitations. No shortness of breath. No SANTIAGO  Lung: No shortness of breath or cough. No sputum production  Abdomen:  nausea, diarrhea, no vomiting or abdominal pain. Delwyn Scarce No cramps. Genitourinary: No increased urinary frequency, or dysuria. No hematuria. No suprapubic or CVA pain  Musculoskeletal: Chronic back pain  Hematologic: Rt toes with ecchymosis. Pt able to wriggle all toes without pain  Neurologic: No headache, weakness, numbness, or tingling. Integument: No rash, no ulcers. Psychiatric: No depression. Endocrine: No polyuria, no polydipsia, no polyphagia.     Physical Examination :     Patient Vitals for the past 8 hrs:   BP Temp Temp src Pulse Resp SpO2   08/10/20 0748 (!) 127/51 96.8 °F (36 °C) Axillary 78 21 --   08/10/20 0430 -- -- -- 75 28 96 %   08/10/20 0414 (!) 126/50 97 °F (36.1 °C) Axillary 83 27 (!) 88 % General Appearance: Awake, alert, and in distress  Head:  Normocephalic, no trauma  Eyes: Pupils equal, round, reactive to light and accommodation; extraocular movements intact; sclera anicteric; conjunctivae pink. No embolic phenomena. ENT: Oropharynx clear, without erythema, exudate, or thrush. No tenderness of sinuses. Mouth/throat: mucosa pink and moist. No lesions. Dentition in good repair. Neck:Supple, without lymphadenopathy. Thyroid normal, No bruits. Pulmonary/Chest: Clear to auscultation, generally diminished throughout without wheezes, rales, or rhonchi. No dullness to percussion. Cardiovascular: Regular tachcardic rate and rhythm with ectopic beats  Abdomen: Soft, non tender. Bowel sounds normal. No organomegaly  All four Extremities: Bilateral mild edema  Neurologic: No gross sensory or motor deficits. Skin: Warm and dry with good turgor. signs of peripheral arterial  insufficiency. No ulcerations. No open wounds. Medical Decision Making -Laboratory:   I have independently reviewed/ordered the following labs:    CBC with Differential:   Recent Labs     08/09/20  0512 08/10/20  0348   WBC 9.2 12.5*   HGB 10.8* 11.8*   HCT 33.2* 35.9*   PLT 98* 113*   LYMPHOPCT 5* 6*   MONOPCT 7 5     BMP:   Recent Labs     08/07/20  2055  08/09/20  0512 08/10/20  0348   NA  --    < > 140 141   K  --    < > 4.5 3.9   CL  --    < > 105 106   CO2  --    < > 15* 18*   BUN  --    < > 62* 66*   CREATININE  --    < > 1.39* 1.48*   MG 2.5  --   --   --     < > = values in this interval not displayed. Hepatic Function Panel:   No results for input(s): PROT, LABALBU, BILIDIR, IBILI, BILITOT, ALKPHOS, ALT, AST in the last 72 hours. No results for input(s): RPR in the last 72 hours. No results for input(s): HIV in the last 72 hours. No results for input(s): BC in the last 72 hours.   Lab Results   Component Value Date    RBC 3.82 08/10/2020    WBC 12.5 08/10/2020     Lab Results   Component Value Date CREATININE 1.48 08/10/2020    GLUCOSE 147 08/10/2020       Medical Decision Making-Imaging:     EXAMINATION:    ONE XRAY VIEW OF THE CHEST         8/10/2020 6:16 am         COMPARISON:    August 6, 2020         HISTORY:    ORDERING SYSTEM PROVIDED HISTORY: Sob    TECHNOLOGIST PROVIDED HISTORY:    Sob    Reason for Exam: shortness of breath upright port         FINDINGS:    Increased subcutaneous air along the upper chest and lower neck.  Possible    free air within the upper abdomen.         Partial clearing of bilateral lung opacities.  Cardiomegaly.              Impression    Increased subcutaneous air.         Questionable free air within the upper abdomen.         Partial clearing of bilateral lung opacities. EXAMINATION:    ONE XRAY VIEW OF THE CHEST         8/6/2020 2:56 pm         COMPARISON:    AP chest from 08/05/2020         HISTORY:    ORDERING SYSTEM PROVIDED HISTORY: COVID 19    TECHNOLOGIST PROVIDED HISTORY:    COVID 19    Acuity: Unknown    Type of Exam: Unknown         Additional history of diabetes and hypertension.         FINDINGS:    Overlying ECG monitor leads and gown snaps.         Mildly enlarged cardiac silhouette with a left ventricular configuration. Mediastinal structures midline unchanged, again with heavy calcification and    elongation thoracic aorta.         Diffuse airspace and interstitial abnormalities, denser in the lower lung    zones, slightly greater on the left, compatible with a history.  Some    superimposed vascular congestion and bibasilar atelectasis likely.  Possible    small left pleural effusion.  No large effusion.  No pneumothorax.         Bones appear unchanged.              Impression    Bilateral parenchymal abnormalities, compatible with the history and perhaps    some superimposed vascular congestion and bibasilar atelectasis.  Possible    tiny left pleural effusion.         Medical Decision Bahali-Qmzomdqb-Lubrs:       Medical Decision Making-Other:

## 2020-08-10 NOTE — PROGRESS NOTES
Pt sating mid 80's but consistently dropping despite being on 15L NRB. Pt also much more restless/anxious. NP paged. Labs, EKG, CXR, and new med orders placed. Labs collected and sent. EKG completed. Respiratory at bedside to complete ABG's. Vitals stable otherwise. Will call daughter with new update in the morning.

## 2020-08-10 NOTE — CARE COORDINATION
Received call from [facility] at [address], they can accept patient when medically ready, still on NRB mask    [phone] received call from [facility] at [address], let her  know bipap settings prior to discharge

## 2020-08-10 NOTE — CARE COORDINATION
8/10/20 [facility] uploaded to chart and can be viewed int he media tab     the [facility] Predict Outcome report for Pily Alba, [id number] . Predict is recommending SNF with expected length of stay of 15.8days, and projected CG hours of 4.25   post stay. He would benefit from daily skilled nursing and therapy to increase strength and functional independence, given he lives alone and is well below his prior level of functioning.              Connor Siu RN  Centralized Care Coordinator  M: [phone]        Kyler Alexander is Guiding the [facility]

## 2020-08-11 NOTE — PROGRESS NOTES
[facility] [address] Patient  Today's Date and Time: 8/11/2020, 9:27 AM    Impression :   · COVID 19 Suspect  · COVID 19 Confirmed Infection  · COVID tested [facility] on 8/5/2020 positive  · High Inflammatory markers  · BABAK on CKD  · DM insulin dependent  · HTN  · Myelodysplastic Syndrome  · Chronic back pain    Recommendations:   · Diuresis  · Started Remdesivir 8/5-->8/9  · Started Decadron 8/5-8/10  · Received immune plasma on 8-6-20. · CXR 8-6-20 shows worsening of infiltrate. Medical Decision Making/Summary/Discussion:8/11/2020     · Patient admitted with suspected COVID 19 infection  · Covid test confirmed positive. · Multiple co morbidities. Pt with hypoxia, requiring 15L NRB and BABAK  · Transferred [facility] to [facility] for further care  · Plan to start Remdesivir 8/5-8/9  · Start Decadron 6mg IV daily 8/5-8/10  · Patient has agreed to convalescent plasma  · Received immune plasma on 8-6-20 without any complications  · Pt is clear that he is a DRN CCA - no intubation or chest compressions   · 8-9-20 Drop in 02 sat. Bumex started, with good response  Infection Control Recommendations   · Universal Precautions  · Airborne isolation  · Droplet Isolation    Antimicrobial Stewardship Recommendations     · Simplification of therapy  · Targeted therapy    Coordination of Outpatient Care:   · Estimated Length of IV antimicrobials:8/5-8/9  · Patient will need Midline Catheter Insertion: TBD  · Patient will need PICC line Insertion: No  · Patient will need: Home IV , [place],  SNF,  LTAC:TBD  · Patient will need outpatient wound care:No    Chief complaint/reason for consultation:   · Concern for COVID infection      History of Present Illness:   Irma Clark is a 80y.o.-year-old  male who was initially admitted on 8/5/2020.  Patient seen at the request of Dr. Vanesa Farmer:    Patient is an 79 yo gentleman with a PMH of DM II - insulin dependent, HTN, CKD and a new diagnosis of myelodysplastic syndrome. He was hospitalized and [facility] from July 28-30 after falling at home. Today (8-5) he was at his Hematologists office and reported that he was SOB. His SaO2 was in the low 80's and he was transported to the ED. Per the notes, a CXR showed patchy infiltrates, pt was tachycardic with SaO2 85% on RA. Labs showed  Cr 2.47  D-dimer 1.75  [id number]    AST 74  ALT 36    Viral PCR negative  COVID positive    Pt was transferred to [facility] for further care. He is AAO, reporting that he has been feeling very weak at home. No SOB, but nausea and diarrhea. Poor appetite, little PO intake. He states that he fell on 8-4-20 due to his weakness. Pt denies fevers or chills, no cough, no sputum production    He is clear that he does not wish to be intubated or have chest compressions. He does wish full treatment until either of those is necessary. CURRENT EVALUATION :8/11/2020    Patient evaluated and examined in the ICU. Pt is AA, intermittently agitated and confused  Now on high flow O2 30L 100% high flow  Periods of agitation during the night  Tachycardia improved    Afebrile  VSS    Patient exhibiting respiratory distress Yes  Respiratory secretions: No    Patient receiving supplemental oxygen. 15L NRB-->6L min-->15L NRB-> high flow 30L 100%  02 sat  99-->95-->98-->87->92->90%  RR 24-->32->21->22      QTc: 486    (Definition of High Risk Comorbid Conditions: Asthma, COPD, Heart Failure of any cause, DM, Hematologic malignancy, Immunocompromised taking >20 mg/day Prednisone).     Baseline Number of High Risk Comorbid conditions:    5  Respiratory Support Level Score: (Room Air= 1 ;Supplemental 02 1L to 15 L/min= 2; Intubation and mechanical Ventilation = 3; Failure to support Ventilatory needs resulting in death =4)   2    NEWS Score: 0-4 Low risk group; 5-6: Medium risk group; 7 or above: High risk group  Parameters 3 2 1 0 1 2 3   Age    < 72   ? 65   RR ? 8  9-11 12-20  21-24 ? 25   O2 Sats ? 91 92-93 94-95 ? 96      Suppl O2  Yes  No      SBP ? 90  101-110 111-219   ? 220   HR ? 40  41-50 51-90  111-130 ? 131   Consciousness    Alert   Drowsiness, lethargy, or confusion   Temperature ? 35.0 C (95.0 F)  35.1-36.0 C 95.1-96.9 F 36.1-38.0 C 97.0-100.4 F 38.1-39.0 C 100.5-102.3 F ? 39.1 C ? 102.4 F      NEWS Score:   8-5-20: 12 high risk    Overall Daily Picture:    worsening     Presence of secondary bacterial Infection:  No   Additional antibiotics: None     Labs, X rays reviewed: 8/11/2020    BUN: 76-->63-->56--62->66->69  Cr: 2.43-->1.65-->1.15-->1.39->1.48  D dimer 1.75    Pro BNP 1019    WBC: 9.6-->9.9-->12.1-->9.2->12.5->11.0  Hb: 9.9-->10.6-->11.3-->10.8->11.8->13.2  Plat: 164-->111-->116-->134-->98->113->112    Absolute Neutrophils:5.59  Absolute Lymphocytes:0.52  Neutrophil/Lymphocyte Ratio: 10.7->14.1 very high risk    CRP: 140.9  Ferritin: 7,343-->5676  LDH: 803    Pro Calcitonin:0.32    Cultures:  Urine:  ·   Blood:  ·   Sputum :  ·   Wound:       CXR:   · 8/6/2020: Bilateral parenchymal abnormalities, superimposed vascular congestion and bibasilar atelectasis. Small left pleural effusion  8-10: Increased subcutaneous air. Questionable free air within the upper abdomen. Partial clearing of bilateral lung opacities. ·   CAT:    Discussed with patient, RN, IM.    8-10      8/6/2020    I have personally reviewed the past medical history, past surgical history, medications, social history, and family history, and I have updated the database accordingly.   Past Medical History:     Past Medical History:   Diagnosis Date    Aortic stenosis     Essential hypertension     Hyperlipidemia     Insulin-requiring or dependent type II diabetes mellitus ([facility]     MDS ([facility]elodysplastic syndrome) [facility]     Patient in clinical research study 08/06/2020    Convalescent plasma Expected Normocephalic, no trauma  Eyes: Pupils equal, round, reactive to light and accommodation; extraocular movements intact; sclera anicteric; conjunctivae pink. No embolic phenomena. ENT: Oropharynx clear, without erythema, exudate, or thrush. No tenderness of sinuses. Mouth/throat: mucosa pink and moist. No lesions. Dentition in good repair. Neck:Supple, without lymphadenopathy. Thyroid normal, No bruits. Pulmonary/Chest: Clear to auscultation, generally diminished throughout without wheezes, rales, or rhonchi. No dullness to percussion. Cardiovascular: Irregular tachcardic rate and rhythm with ectopic beats  Abdomen: Soft, non tender. Bowel sounds normal. No organomegaly  All four Extremities: Bilateral mild edema  Neurologic: No gross sensory or motor deficits. Skin: Warm and dry with good turgor. signs of peripheral arterial  insufficiency. No ulcerations. No open wounds. Medical Decision Making -Laboratory:   I have independently reviewed/ordered the following labs:    CBC with Differential:   Recent Labs     08/10/20  0348 08/11/20  0442   WBC 12.5* 11.0   HGB 11.8* 13.2   HCT 35.9* 40.5*   * 112*   LYMPHOPCT 6* 7*   MONOPCT 5 7     BMP:   Recent Labs     08/10/20  0348 08/11/20  0442    143   K 3.9 4.5    105   CO2 18* 21   BUN 66* 69*   CREATININE 1.48* 1.48*     Hepatic Function Panel:   No results for input(s): PROT, LABALBU, BILIDIR, IBILI, BILITOT, ALKPHOS, ALT, AST in the last 72 hours. No results for input(s): RPR in the last 72 hours. No results for input(s): HIV in the last 72 hours. No results for input(s): BC in the last 72 hours.   Lab Results   Component Value Date    RBC 4.29 08/11/2020    WBC 11.0 08/11/2020     Lab Results   Component Value Date    CREATININE 1.48 08/11/2020    GLUCOSE 161 08/11/2020       Medical Decision Making-Imaging:     EXAMINATION:    ONE XRAY VIEW OF THE CHEST         8/10/2020 6:16 am         COMPARISON:    August 6, 2020         HISTORY: Prevention measures reviewed  · All prior entries were reviewed  · Administer medications as ordered  · Prognosis: Guarded  · Discharge planning reviewed  · Follow up as outpatient. Thank you for allowing us to participate in the care of this patient. Please call with questions.     Sunitha Ramirez MD  Pager: [phone] - Office: [phone]

## 2020-08-11 NOTE — PLAN OF CARE
Problem: Falls - Risk of:  Goal: Will remain free from falls  Description: Will remain free from falls  Outcome: Ongoing  Goal: Absence of physical injury  Description: Absence of physical injury  Outcome: Ongoing     Problem: Nutrition Deficit:  Goal: Ability to achieve adequate nutritional intake will improve  Description: Ability to achieve adequate nutritional intake will improve  Outcome: Ongoing     Problem: SAFETY  Goal: Free from accidental physical injury  Outcome: Ongoing  Goal: Free from intentional harm  Outcome: Ongoing     Problem: DAILY CARE  Goal: Daily care needs are met  Outcome: Ongoing     Problem: PAIN  Goal: Patient's pain/discomfort is manageable  Outcome: Ongoing     Problem: Musculor/Skeletal Functional Status  Goal: Highest potential functional level  Outcome: Ongoing  Goal: Absence of falls  Outcome: Ongoing     Problem: Skin Integrity:  Goal: Will show no infection signs and symptoms  Description: Will show no infection signs and symptoms  Outcome: Ongoing  Goal: Absence of new skin breakdown  Description: Absence of new skin breakdown  Outcome: Ongoing     Problem: Restraint Use - Nonviolent/Non-Self-Destructive Behavior:  Goal: Absence of restraint indications  Description: Absence of restraint indications  Outcome: Ongoing  Goal: Absence of restraint-related injury  Description: Absence of restraint-related injury  Outcome: Ongoing     Problem: OXYGENATION/RESPIRATORY FUNCTION  Goal: Patient will maintain patent airway  Outcome: Ongoing  Goal: Patient will achieve/maintain normal respiratory rate/effort  Description: Respiratory rate and effort will be within normal limits for the patient  Outcome: Ongoing

## 2020-08-11 NOTE — PROGRESS NOTES
[address]    Progress Note    8/11/2020    11:40 AM    Name:   Yousuf Leonard  MRN:     [id number]     [place]:      [de-identified]   Room:   [facility]  IP Day:  6  Admit Date:  8/5/2020  2:22 PM    PCP:   Mina Mccann  Code Status:  DNR-CCA    Subjective:     C/C: sob  Interval History Status: improved. Apparently had a good night last night-was not confused  Still in restraints this am but mental status much better today    Brief History:     Per my partner:  \"Per my associate:     At 5 together grossly the second is wondering if there are some notes like you get interrupted with talking pages about this patient's ongoing Marcille Dakota a 80 y. o. male who presents with shortness of breath.  [facility] for the management of Acute respiratory failure due to COVID-19.     [facility] having a syncopal event. Amado Mahan believes this was attributed to his b/p being low.  During that stay he was diagnosed with MDS.  Today the pt was being evaluated by hematology when he reported worsening shortness of breath.  Oxygen sats were noted to be in low 80's.  Pt was sent to ED where he tested + for covid.  CXR showed patchy infiltrates.  Pt was started on 4L/nc but required non rebreather prior to transfer. Amado Mahan also received Decadron, Zithromax and Duoneb.       The hospitalist service did not feel comfortable admitting him at [facility] is a DNRCC-A and does not want CPR or intubation. \"    Review of Systems:     Constitutional:  negative for chills, fevers, sweats  Respiratory:  positive for cough, dyspnea on exertion, shortness of breath  Cardiovascular:  negative for chest pain, chest pressure/discomfort, lower extremity edema, palpitations  Gastrointestinal:  negative for abdominal pain, constipation, diarrhea, nausea, vomiting  Neurological:  negative for dizziness, headache    Medications: Allergies: Allergies   Allergen Reactions    Lovaza [Omega-3-Acid Ethyl Esters] Anaphylaxis    Metformin And Related Anaphylaxis       Current Meds:   Scheduled Meds:    sodium bicarbonate  650 mg Oral 4x Daily    QUEtiapine  25 mg Oral BID    bumetanide  1 mg Oral Daily    enoxaparin  30 mg Subcutaneous BID    insulin glargine  30 Units Subcutaneous Nightly    sodium chloride  20 mL Intravenous Once    amLODIPine  5 mg Oral Daily    sodium chloride flush  10 mL Intravenous 2 times per day    insulin lispro  0-12 Units Subcutaneous TID WC    insulin lispro  0-6 Units Subcutaneous Nightly    metoprolol tartrate  50 mg Oral BID    calcium citrate  950 mg Oral Daily    therapeutic multivitamin-minerals  1 tablet Oral Daily    pantoprazole  40 mg Oral QAM AC     Continuous Infusions:    dextrose       PRN Meds: LORazepam, baclofen, glucose, dextrose, glucagon (rDNA), dextrose, sodium chloride flush, potassium chloride **OR** potassium alternative oral replacement **OR** potassium chloride, magnesium sulfate, acetaminophen **OR** acetaminophen, polyethylene glycol, promethazine **OR** ondansetron, traMADol, sodium chloride    Data:     Past Medical History:   has a past medical history of Aortic stenosis, Essential hypertension, Hyperlipidemia, Insulin-requiring or dependent type II diabetes mellitus ([facility] [address] MDS (myelodysplastic syndrome) ([facility] [address] and Patient in clinical research study. Social History:   reports that he quit smoking about 3 years ago. His smoking use included cigarettes. He started smoking about 53 years ago. He does not have any smokeless tobacco history on file. He reports current alcohol use of about 1.0 standard drinks of alcohol per week.      Family History:   Family History   Problem Relation Age of Onset    Breast Cancer Father     Prostate Cancer Father     Prostate Cancer Brother        Vitals:  BP (!) 126/57   Pulse 79   Temp 97.5 °F (36.4 °C) (Axillary)   Resp 20   Ht 5' 8\" (1.727 m)   Wt 160 lb 0.9 oz (72.6 kg)   SpO2 98%   BMI 24.34 kg/m²   Temp (24hrs), Av.4 °F (36.3 °C), Min:97.3 °F (36.3 °C), Max:97.5 °F (36.4 °C)    Recent Labs     08/10/20  1155 08/10/20  1643 08/10/20  2011 08/11/20  0923   POCGLU 148* 195* 225* 164*       I/O (24Hr): No intake or output data in the 24 hours ending 20 1140    Labs:  Hematology:  Recent Labs     08/09/20  0512 08/10/20  0348 08/10/20  0440 08/11/20  044   WBC 9.2 12.5*  --  11.0   RBC 3.49* 3.82*  --  4.29   HGB 10.8* 11.8*  --  13.2   HCT 33.2* 35.9*  --  40.5*   MCV 95.1 94.0  --  94.4   MCH 30.9 30.9  --  30.8   MCHC 32.5 32.9  --  32.6   RDW 13.8 13.6  --  13.7   PLT 98* 113*  --  112*   MPV 10.0 10.4  --  10.5   DDIMER  --   --  0.72  --      Chemistry:  Recent Labs     08/09/20  0512 08/10/20  0348 08/10/20  0349 08/11/20  0442    141  --  143   K 4.5 3.9  --  4.5    106  --  105   CO2 15* 18*  --  21   GLUCOSE 201* 147*  --  161*   BUN 62* 66*  --  69*   CREATININE 1.39* 1.48*  --  1.48*   ANIONGAP 20* 17  --  17   LABGLOM 48* 45*  --  45*   GFRAA 58* 54*  --  54*   CALCIUM 8.7 8.7  --  8.9   PROBNP  --  1,019*  --   --    TROPHS 31*  --   --   --    MYOGLOBIN 127*  --   --   --    LACTACIDWB  --   --  2.3*  --      Recent Labs     08/09/20  2031 08/10/20  0757 08/10/20  1155 08/10/20  1643 08/10/20  2011 08/11/20  0923   POCGLU 230* 129* 148* 195* 225* 164*     ABG:  Lab Results   Component Value Date    POCPH 7.469 08/10/2020    POCPCO2 27.0 08/10/2020    POCPO2 69.8 08/10/2020    POCHCO3 19.6 08/10/2020    NBEA 3 08/10/2020    PBEA NOT REPORTED 08/10/2020    PQS0IZX 21 08/10/2020    ACZD5MGQ 95 08/10/2020    FIO2 15.0 08/10/2020     No results found for: SPECIAL  No results found for: CULTURE    Radiology:  Xr Abdomen (kub) (single Ap View)    Result Date: 8/10/2020  Somewhat degraded by motion. No large quantity free air in the abdomen.  Tiny amount not excluded on this study. RECOMMENDATION: If there is clinical concern for free air, consider additional left-side-down decubitus view or CT. Xr Chest Portable    Result Date: 8/10/2020  Increased subcutaneous air. Questionable free air within the upper abdomen. Partial clearing of bilateral lung opacities. Xr Chest Portable    Result Date: 8/6/2020  Bilateral parenchymal abnormalities, compatible with the history and perhaps some superimposed vascular congestion and bibasilar atelectasis. Possible tiny left pleural effusion. Physical Examination:        General appearance:  alert, cooperative and no distress  Mental Status:  oriented to person, and time but not place and normal affect  Lungs:  clear to auscultation bilaterally, normal effort on hfnc  Heart:  regular rate and rhythm, no murmur  Abdomen:  soft, nontender, nondistended, normal bowel sounds, no masses, hepatomegaly, splenomegaly  Extremities:  no edema, redness, tenderness in the calves  Skin:  no gross lesions, rashes, induration    Assessment:        Hospital Problems           Last Modified POA    * (Principal) Acute respiratory failure due to COVID-19 8/6/2020 Yes    Acute respiratory failure with hypoxia ([address] 8/6/2020 Yes    Pneumonia due to COVID-19 virus 8/6/2020 Yes    Delirium 8/10/2020 No    CKD (chronic kidney disease), stage III ([address] 8/9/2020 Yes    MDS (myelodysplastic syndrome) ([address] 8/6/2020 Yes    Insulin-requiring or dependent type II diabetes mellitus ([address] 8/6/2020 Yes    Hyperlipidemia 8/6/2020 Yes    Essential hypertension 8/6/2020 Yes    Aortic stenosis 8/5/2020 Yes          Plan:        1. Wean hfnc as able  2. Take off restraints and see how he does  3.  Will keep telesitter for today    [place] Ao Blood, DO  8/11/2020  11:40 AM

## 2020-08-11 NOTE — CARE COORDINATION
Care Transition  [address] at [address], she is following, cannot accept if he needs high flow. She will continue to follow.

## 2020-08-11 NOTE — PROGRESS NOTES
mobility  Rolling to Left: Minimal assistance  Rolling to Right: Minimal assistance  Comment: Pt required cueing for sequencing and hand palcement wit good follow through, Slow to rest to command. Exercises  Comments: . Ankle pumps, heel slide, short arc quads, straight leg raises. Reps 15  ,Upper extremity exercises: Bicep curl, shoulder flexion/extension, punches, tricep curl, shoulder abduction/adduction. Reps: x15 repa all planes. s   pt requires AAROM initially  and then progress to AROM with constant tactile/VC to stay on task and proper technique  Goals  Short term goals  Time Frame for Short term goals: 12 visits  Short term goal 1: independent bed mobility without use of bedrails  Short term goal 2: independent transfers  Short term goal 3: independent gait with rw x 50'  Short term goal 4: independent stair ambulation x 2 steps with 1 HR  Patient Goals   Patient goals : breathe better, return home    Plan    Plan  Times per week: 5-6 visits weekly  Times per day: Daily  Current Treatment Recommendations: Strengthening, ROM, Balance Training, Functional Mobility Training, Transfer Training, Endurance Training, Gait Training, Stair training, Safety Education & Training, Positioning  Safety Devices  Type of devices: Call light within reach, Patient at risk for falls, Nurse notified, Bed alarm in place  Restraints  Initially in place: Yes  Restraints: bilateral soft wrist restraints     Therapy Time   Individual Concurrent Group Co-treatment   Time In 0855         Time Out 0920         Minutes 25         Timed Code Treatment Minutes: [address], PTA

## 2020-08-11 NOTE — PROGRESS NOTES
[facility]  Progress Note                   Date:   8/11/2020  Patient name: Ronit Torre  Date of admission:  8/5/2020  2:22 PM  MRN:   [id number]  YOB: 1932  PCP: Dottie Frank    Reason for Admission: Pneumonia [J18.9]    Subjective:       Clinical Changes /Abnormalities:  Discussed patient with RN. D/t Covid 19 exam was deferred to primary. No CV concerns overnight. SR on tele. Awaiting results of ECHO. Review of Systems    Medications:   Scheduled Meds:   sodium bicarbonate  650 mg Oral 4x Daily    QUEtiapine  25 mg Oral BID    bumetanide  1 mg Oral Daily    enoxaparin  30 mg Subcutaneous BID    dexamethasone  6 mg Oral Daily    insulin glargine  30 Units Subcutaneous Nightly    sodium chloride  20 mL Intravenous Once    amLODIPine  5 mg Oral Daily    sodium chloride flush  10 mL Intravenous 2 times per day    insulin lispro  0-12 Units Subcutaneous TID WC    insulin lispro  0-6 Units Subcutaneous Nightly    metoprolol tartrate  50 mg Oral BID    calcium citrate  950 mg Oral Daily    therapeutic multivitamin-minerals  1 tablet Oral Daily    pantoprazole  40 mg Oral QAM AC     Continuous Infusions:   dextrose       CBC:   Recent Labs     08/09/20  0512 08/10/20  0348 08/11/20  0442   WBC 9.2 12.5* 11.0   HGB 10.8* 11.8* 13.2   PLT 98* 113* 112*     BMP:    Recent Labs     08/09/20  0512 08/10/20  0348 08/11/20  0442    141 143   K 4.5 3.9 4.5    106 105   CO2 15* 18* 21   BUN 62* 66* 69*   CREATININE 1.39* 1.48* 1.48*   GLUCOSE 201* 147* 161*     Hepatic:No results for input(s): AST, ALT, ALB, BILITOT, ALKPHOS in the last 72 hours. Troponin:   Recent Labs     08/09/20  0512   TROPHS 31*     BNP: No results for input(s): BNP in the last 72 hours. Lipids: No results for input(s): CHOL, HDL in the last 72 hours. Invalid input(s): LDLCALCU  INR: No results for input(s): INR in the last 72 hours.     Objective:   Vitals: BP (!) 114/46   Pulse 64   Temp 97.5 °F (36.4 °C) (Axillary)   Resp 20   Ht 5' 8\" (1.727 m)   Wt 160 lb 0.9 oz (72.6 kg)   SpO2 97%   BMI 24.34 kg/m²     For careful stewardship of limited PPE during COVID-19 pandemic my physical exam was deferred. For physical exam, please see today's physical from primary team or ICU team.     Echo from [address] [place] [address] 2016 with LVEF >50%, mild LVH, mild aortic valve regurg, no AS      Assessment / Acute Cardiac Problems:   1. Acute resp failure secondary to COVID-19  2. Trigeminy on tele  3. IDDM  4. MDS  5. HTN  6. HLP      Patient Active Problem List:     Pneumonia     Acute respiratory failure due to COVID-19     MDS (myelodysplastic syndrome) ([facility]     Insulin-requiring or dependent type II diabetes mellitus ([facility]     Hyperlipidemia     Essential hypertension     Aortic stenosis     Elevated prostate specific antigen (PSA)     History of prostate cancer     Acute respiratory failure with hypoxia (HCC)     Pneumonia due to COVID-19 virus     CKD (chronic kidney disease), stage III ([address]      Plan of Treatment:   1. Stable. Continue to monitor e-lytes and keep K> 4 and Mg >2.  2. Nonspecific troponin elevation , likely type II MI from COVID-19 infection  3. HTN controlled  Continue CCB and BB. 4. PAT  Occasional episodes of tachycardia. No episodes overnight. Continue PRN lopressor    5. Awaiting results ECHO. Pt. Is DNRCC-A and does not want intubation. 6. Creat continues to rise slowly.  Management per attenting    Electronically signed by SHADIA Beauchamp NP on 8/11/2020 at 8:33 AM  [address]  [phone]

## 2020-08-12 NOTE — PROGRESS NOTES
Writer wrote, \"pt has telesitter still in room. It is safe to remove the telesitter. Do I need an order placed to remove? If yes please place order. Thank you! \"  Read 5:44 PM     No new orders placed at this time. Will continue to monitor.      Electronically signed by Brian Batista RN on 8/12/2020 at 6:30 PM

## 2020-08-12 NOTE — PROGRESS NOTES
Comprehensive Nutrition Assessment    Type and Reason for Visit:  Initial(LOS)    Nutrition Recommendations/Plan:   - Continue current general diet with moderately thick liquids. - Start Magic cup frozen ONS BID.  - Monitor labs, weight, and PO/supplement intake. Nutrition Assessment:  Pt admitted d/t acute respiratory failure. Pt had lunch in front of him, but was not eating. Noted 1-25% intake per EMR review. Pt was consuming liquids quite frequently. Will provide Magic Cup ONS. Malnutrition Assessment:  Malnutrition Status: Moderate malnutrition    Context:  Acute Illness     Findings of the 6 clinical characteristics of malnutrition:  Energy Intake:  7 - 50% or less of estimated energy requirements for 5 or more days  Weight Loss:  Unable to assess     Body Fat Loss:  No significant body fat loss     Muscle Mass Loss:  1 - Mild muscle mass loss Temples (temporalis)  Fluid Accumulation:  1 - Mild Extremities, Generalized   Strength:  Not Performed    Estimated Daily Nutrient Needs:  Energy (kcal):  2262-3856; Weight Used for Energy Requirements:  Current     Protein (g):  72-87 g/day; Weight Used for Protein Requirements:  Ideal(1-1.2 g/kg)           Nutrition Related Findings:  Labs reviewed: Glu 172. Meds reviewed.  +BM 8/8      Wounds:  None       Current Nutrition Therapies:    DIET GENERAL; Moderately Thick (Honey)    Anthropometric Measures:  · Height: 5' 8\" (172.7 cm)  · Current Body Weight: 160 lb (72.6 kg)   · Admission Body Weight: 160 lb (72.6 kg)    · Ideal Body Weight: 154 lbs; % Ideal Body Weight   103%  · BMI: 24.3  · BMI Categories: Normal Weight (BMI 22.0 to 24.9) age over 72       Nutrition Diagnosis:   · Inadequate oral intake related to impaired respiratory funtion as evidenced by intake 0-25%, mild muscle loss      Nutrition Interventions:   Food and/or Nutrient Delivery:  Continue Current Diet, Start Oral Nutrition Supplement  Nutrition Education/Counseling:  No recommendation at this time   Coordination of Nutrition Care:  Continued Inpatient Monitoring    Goals:  Meet greater than 50% of recommended nutritional needs  - goal set    Nutrition Monitoring and Evaluation:   Behavioral-Environmental Outcomes: Other (Comment)(N/A)   Food/Nutrient Intake Outcomes:  Food and Nutrient Intake, Supplement Intake  Physical Signs/Symptoms Outcomes:  Biochemical Data, Nutrition Focused Physical Findings, Skin, Weight     Discharge Planning:     Too soon to determine     Electronically signed by Annabel Portillo on 8/12/20 at 3:37 PM EDT    Contact: [phone]

## 2020-08-12 NOTE — CARE COORDINATION
TRANSITIONAL CARE PLANNING/ 2 [facility]: 7    Reason for Admission: Pneumonia [J18.9]     Treatment Plan of Care: dnrcca, high flow 60%         Readmission Risk            Risk of Unplanned Readmission:      22            Patient goals/Treatment Preferences/Transitional Plan: accepted divine daughter homer wants other referrals made     Referrals Made: ezekiel sm[facility] (rain confirmed receipt of referral will review), [facility]

## 2020-08-12 NOTE — PROGRESS NOTES
[facility]  Occupational Therapy Not Seen Note    DATE: 2020  Name: Linn Cantrell  : 1932  MRN: [id number]    Patient not available for Occupational Therapy due to:    [] Testing:    [] Hemodialysis    [] Blood Transfusion in Progress    []Refusal by Patient:    [] Surgery/Procedure:    [] Strict Bedrest    [] Sedation    [] Spine Precautions     [] Pt being transferred to palliative care at this time. Spoke with pt/family and OT services to be defered. [] Pt independent with functional mobility and functional tasks.  Pt with no OT acute care needs at this time, will defer OT eval.    [x] Other: Pt O2 sats dropping to mid-70s sitting EOB in AM with PT; Spoke with RN, await until      Next Scheduled Treatment: Ck      Jaspal Osei OTR/L

## 2020-08-12 NOTE — PROGRESS NOTES
[facility]    Progress Note    8/12/2020    1:39 PM    Name:   Ruddy Bansal  MRN:     [id number]     [place]:      [de-identified]   Room:   [facility] [place]note 2[place] [address]  IP Day:  7  Admit Date:  8/5/2020  2:22 PM    PCP:   America Rey  Code Status:  DNR-CCA    Subjective:     C/C: sob  Interval History Status: improved. Feels better today; states he wants to get up and move around    RN told me he desatted to 76s when mobilized by therapy    Brief History:     Per my partner:  \"Per my associate:     At 5 together grossly the second is wondering if there are some notes like you get interrupted with talking pages about this patient's ongoing Yesica Gain a 80 y. o. male who presents with shortness of breath.  He was directly admitted from [facility] ED Aug 5 for the management of Acute respiratory failure due to COVID-19.     The patient was admitted to [facility] last week after having a syncopal event. Samm Ortiz believes this was attributed to his b/p being low.  During that stay he was diagnosed with MDS.  Today the pt was being evaluated by hematology when he reported worsening shortness of breath.  Oxygen sats were noted to be in low 80's.  Pt was sent to ED where he tested + for covid.  CXR showed patchy infiltrates.  Pt was started on 4L/nc but required non rebreather prior to transfer. Samm Ortiz also received Decadron, Zithromax and Duoneb.       The hospitalist service did not feel comfortable admitting him at [facility] is a DNRCC-A and does not want CPR or intubation. \"    Review of Systems:     Constitutional:  negative for chills, fevers, sweats  Respiratory:  positive for cough, dyspnea on exertion, shortness of breath  Cardiovascular:  negative for chest pain, chest pressure/discomfort, lower extremity edema, palpitations  Gastrointestinal:  negative for abdominal pain, constipation, diarrhea, nausea, vomiting  Neurological:  negative for dizziness, headache    Medications: Allergies: Allergies   Allergen Reactions    Lovaza [Omega-3-Acid Ethyl Esters] Anaphylaxis    Metformin And Related Anaphylaxis       Current Meds:   Scheduled Meds:    sodium bicarbonate  650 mg Oral 4x Daily    QUEtiapine  25 mg Oral BID    bumetanide  1 mg Oral Daily    enoxaparin  30 mg Subcutaneous BID    insulin glargine  30 Units Subcutaneous Nightly    sodium chloride  20 mL Intravenous Once    amLODIPine  5 mg Oral Daily    sodium chloride flush  10 mL Intravenous 2 times per day    insulin lispro  0-12 Units Subcutaneous TID WC    insulin lispro  0-6 Units Subcutaneous Nightly    metoprolol tartrate  50 mg Oral BID    calcium citrate  950 mg Oral Daily    therapeutic multivitamin-minerals  1 tablet Oral Daily    pantoprazole  40 mg Oral QAM AC     Continuous Infusions:    dextrose       PRN Meds: LORazepam, baclofen, glucose, dextrose, glucagon (rDNA), dextrose, sodium chloride flush, potassium chloride **OR** potassium alternative oral replacement **OR** potassium chloride, magnesium sulfate, acetaminophen **OR** acetaminophen, polyethylene glycol, promethazine **OR** ondansetron, traMADol, sodium chloride    Data:     Past Medical History:   has a past medical history of Aortic stenosis, Essential hypertension, Hyperlipidemia, Insulin-requiring or dependent type II diabetes mellitus ([facility] [address] MDS (myelodysplastic syndrome) ([facility] [address] and Patient in clinical research study. Social History:   reports that he quit smoking about 3 years ago. His smoking use included cigarettes. He started smoking about 53 years ago. He does not have any smokeless tobacco history on file. He reports current alcohol use of about 1.0 standard drinks of alcohol per week.      Family History:   Family History   Problem Relation Age of Onset    Breast Cancer Father     Prostate Cancer Father     Prostate Cancer Brother        Vitals:  BP (!) 111/51   Pulse 78   Temp 97.8 °F (36.6 °C) (Axillary)   Resp 26   Ht 5' 8\" (1.727 m)   Wt 160 lb 0.9 oz (72.6 kg)   SpO2 99%   BMI 24.34 kg/m²   Temp (24hrs), Av.8 °F (36.6 °C), Min:97.6 °F (36.4 °C), Max:98.2 °F (36.8 °C)    Recent Labs     20  17420  0838 20  1205   POCGLU 185* 208* 148* 172*       I/O (24Hr): Intake/Output Summary (Last 24 hours) at 2020 1339  Last data filed at 2020 0596  Gross per 24 hour   Intake 360 ml   Output 350 ml   Net 10 ml       Labs:  Hematology:  Recent Labs     08/10/20  0348 08/10/20  04420  0442 20  0749   WBC 12.5*  --  11.0 8.4   RBC 3.82*  --  4.29 4.05*   HGB 11.8*  --  13.2 12.4*   HCT 35.9*  --  40.5* 38.4*   MCV 94.0  --  94.4 94.8   MCH 30.9  --  30.8 30.6   MCHC 32.9  --  32.6 32.3   RDW 13.6  --  13.7 14.1   *  --  112* 113*   MPV 10.4  --  10.5 12.7   DDIMER  --  0.72  --  1.02     Chemistry:  Recent Labs     08/10/20  0348 08/10/20  03420  0442     --  143   K 3.9  --  4.5     --  105   CO2 18*  --  21   GLUCOSE 147*  --  161*   BUN 66*  --  69*   CREATININE 1.48*  --  1.48*   ANIONGAP 17  --  17   LABGLOM 45*  --  45*   GFRAA 54*  --  54*   CALCIUM 8.7  --  8.9   PROBNP 1,019*  --   --    LACTACIDWB  --  2.3*  --      Recent Labs     20  0923 20  1305 20  1741 20  0838 20  1205   POCGLU 164* 177* 185* 208* 148* 172*     ABG:  Lab Results   Component Value Date    POCPH 7.469 08/10/2020    POCPCO2 27.0 08/10/2020    POCPO2 69.8 08/10/2020    POCHCO3 19.6 08/10/2020    NBEA 3 08/10/2020    PBEA NOT REPORTED 08/10/2020    DUC0KZF 21 08/10/2020    RZNY8APO 95 08/10/2020    FIO2 15.0 08/10/2020     No results found for: SPECIAL  No results found for: CULTURE    Radiology:  Xr Abdomen (kub) (single Ap View)    Result Date: 8/10/2020  Somewhat degraded by motion. No large quantity free air in the abdomen. Tiny amount not excluded on this study.  RECOMMENDATION: If there is

## 2020-08-12 NOTE — PROGRESS NOTES
Physician Progress Note      William Brown  CSN #:                  [id number]  :                       1932  ADMIT DATE:       2020 2:22 PM  DISCH DATE:  RESPONDING  PROVIDER #:        Silvia YANES BLOOD DO          QUERY TEXT:    Pt admitted with COVID 19 pneumonia. Pt noted to have declining mentation. If   possible, please document in the progress notes and discharge summary if you   are evaluating and / or treating any of the following: The medical record reflects the following:  Risk Factors: COVID pneumonia  Clinical Indicators: Per 8/10 note, continues to be confused and restless. Hypoxia persists. 8/10 O2 sat 76% to low 90s on %. 8/10 Cr 1.48, lactic 2.3, wbc 12.5  Treatment: Change to High Flow O2 30L, 100%; po decadron, monitoring, safety   measures    Thank you, Jenise Peters RN, CDS. Please call with questions [phone]. M-F   6a-2:30p  Options provided:  -- Metabolic encephalopathy  -- Acute encephalopathy due to hypoxia  -- Delirium, Please specify cause  -- Other - I will add my own diagnosis  -- Disagree - Not applicable / Not valid  -- Disagree - Clinically unable to determine / Unknown  -- Refer to Clinical Documentation Reviewer    PROVIDER RESPONSE TEXT:    This patient has metabolic encephalopathy.     Query created by: Nancy Duque on 2020 9:59 AM      Electronically signed by:  Surekha Bradshaw BLOOD DO 2020 12:14 PM

## 2020-08-12 NOTE — PROGRESS NOTES
Physical Therapy  Facility/Department: [facility]  Daily Treatment Note  NAME: Boom Finnegan  : 1932  MRN: [id number]    Date of Service: 2020    Discharge Recommendations:  Patient would benefit from continued therapy after discharge   PT Equipment Recommendations  Equipment Needed: No    Assessment   Assessment: Pt sat EOB x ~4 min, wtih MIN A bed mobility. Further treatment limited by O2 stats dropping to75%, taking >5 min to return to >90%  Prognosis: Good  REQUIRES PT FOLLOW UP: Yes  Activity Tolerance  Activity Tolerance: Patient limited by endurance;Treatment limited secondary to medical complications (free text)  Activity Tolerance: O2 stats     Patient Diagnosis(es): There were no encounter diagnoses. has a past medical history of Aortic stenosis, Essential hypertension, Hyperlipidemia, Insulin-requiring or dependent type II diabetes mellitus ([facility] [address] MDS (myelodysplastic syndrome) ([facility] [address] and Patient in clinical research study. has a past surgical history that includes other surgical history (2020); other surgical history (Right, 10/09/2019); colectomy; Appendectomy; parathyroidectomy; and lumbar laminectomy (). Restrictions  Restrictions/Precautions  Restrictions/Precautions: Fall Risk  Required Braces or Orthoses?: No  Position Activity Restriction  Other position/activity restrictions: HI-ALISSON O2 at 15L  Subjective   General  Response To Previous Treatment: Patient with no complaints from previous session. Family / Caregiver Present: No  Subjective  Subjective: Pt resting in bed upon arrival, agreeable to PT. Pleasant and cooperative throughout.  Unaware of deficits  Pain Screening  Patient Currently in Pain: Denies  Vital Signs  Patient Currently in Pain: Denies       Orientation  Orientation  Overall Orientation Status: Within Functional Limits  Cognition      Objective   Bed mobility  Rolling to Left: Contact guard assistance  Rolling to Right: Contact guard assistance  Supine to Sit: Minimal assistance(trunk support)  Sit to Supine: Minimal assistance(LE support)  Transfers  Sit to Stand: Unable to assess  Comment: Unable to assess transfers this date due to O2 dropping to 75% while seated EOB  Ambulation  Ambulation?: No     Balance  Posture: Fair  Sitting - Static: Good  Sitting - Dynamic: Good(Pt sat EOB x 4 min, with CGA to maintain. Limited as pt O2 dropped to 75% while seated EOB. Max cues for diaphragmatic breating tech, with fair carryover. Pt returned to supine, taking >5 min for O2 stats to remain >90)    Exercise  Supine Exercises: Ankle Pumps, Gluteal sets, Heel Slides, Hip ABD/ADD, Hip IR/ER, Quad Sets, SAQ, SLR. Reps: 10 x, with rest breaks due to O2.         Goals  Short term goals  Time Frame for Short term goals: 12 visits  Short term goal 1: independent bed mobility without use of bedrails  Short term goal 2: independent transfers  Short term goal 3: independent gait with rw x 50'  Short term goal 4: independent stair ambulation x 2 steps with 1 HR  Patient Goals   Patient goals : breathe better, return home    Plan    Plan  Times per week: 5-6 visits weekly  Times per day: Daily  Current Treatment Recommendations: Strengthening, ROM, Balance Training, Functional Mobility Training, Transfer Training, Endurance Training, Gait Training, Stair training, Safety Education & Training, Positioning  Safety Devices  Type of devices: Call light within reach, Patient at risk for falls, Nurse notified, Bed alarm in place  Restraints  Initially in place: No  Restraints: bilateral soft wrist restraints     Therapy Time   Individual Concurrent Group Co-treatment   Time In 0855         Time Out 0940         Minutes 45         Timed Code Treatment [facility]: [facility]

## 2020-08-12 NOTE — PROGRESS NOTES
°F (36.4 °C) (Axillary)   Resp 18   Ht 5' 8\" (1.727 m)   Wt 160 lb 0.9 oz (72.6 kg)   SpO2 99%   BMI 24.34 kg/m²     For careful stewardship of limited PPE during COVID-19 pandemic my physical exam was deferred. For physical exam, please see today's physical from primary team or ICU team.     Echo from [address] [place] [address] 2016 with LVEF >50%, mild LVH, mild aortic valve regurg, no AS      Assessment / Acute Cardiac Problems:   1. Acute resp failure secondary to COVID-19  2. Trigeminy on tele  3. IDDM  4. MDS  5. HTN  6. HLP      Patient Active Problem List:     Pneumonia     Acute respiratory failure due to COVID-19     MDS (myelodysplastic syndrome) ([facility] [address]     Insulin-requiring or dependent type II diabetes mellitus ([facility] [address]     Hyperlipidemia     Essential hypertension     Aortic stenosis     Elevated prostate specific antigen (PSA)     History of prostate cancer     Acute respiratory failure with hypoxia (HCC)     Pneumonia due to COVID-19 virus     CKD (chronic kidney disease), stage III ([facility] [address]      Plan of Treatment:   1. Stable. Continue to monitor e-lytes and keep K> 4 and Mg >2. Will add on MG+ today as not assessed since 8/7  2. Nonspecific troponin elevation , likely type II MI from COVID-19 infection  3. HTN controlled  Continue CCB and BB. 4. PAT  Occasional episodes of tachycardia. No episodes overnight. Continue PO BB   5. Echo reviewed with Dr. James Parker. Diff to assess LVEF but appears preserved. Given COVID-19 positive will not repeat with contrast at this time. Will f/u as OP. 6. No further CV work-up at this time. Will sign off. Please call with further questions/concerns. Will f/u in clinic once COVID cleared.      Electronically signed by SHADIA Gonzalez CNP on 8/12/2020 at 9:52 AM  [address]  [phone]

## 2020-08-12 NOTE — PLAN OF CARE
Problem: Falls - Risk of:  Goal: Will remain free from falls  Description: Will remain free from falls  Outcome: Ongoing  Goal: Absence of physical injury  Description: Absence of physical injury  Outcome: Ongoing     Problem: Nutrition Deficit:  Goal: Ability to achieve adequate nutritional intake will improve  Description: Ability to achieve adequate nutritional intake will improve  Outcome: Ongoing     Problem: SAFETY  Goal: Free from accidental physical injury  Outcome: Ongoing  Goal: Free from intentional harm  Outcome: Ongoing     Problem: DAILY CARE  Goal: Daily care needs are met  Outcome: Ongoing     Problem: PAIN  Goal: Patient's pain/discomfort is manageable  Outcome: Ongoing     Problem: Musculor/Skeletal Functional Status  Goal: Highest potential functional level  Outcome: Ongoing  Goal: Absence of falls  Outcome: Ongoing     Problem: Skin Integrity:  Goal: Will show no infection signs and symptoms  Description: Will show no infection signs and symptoms  Outcome: Ongoing  Goal: Absence of new skin breakdown  Description: Absence of new skin breakdown  Outcome: Ongoing     Problem: Restraint Use - Nonviolent/Non-Self-Destructive Behavior:  Goal: Absence of restraint indications  Description: Absence of restraint indications  Outcome: Ongoing  Goal: Absence of restraint-related injury  Description: Absence of restraint-related injury  Outcome: Ongoing     Problem: OXYGENATION/RESPIRATORY FUNCTION  Goal: Patient will maintain patent airway  8/12/2020 1830 by Andree Ortega RN  Outcome: Ongoing  8/12/2020 1124 by Jody Turcios RCP  Outcome: Ongoing  Goal: Patient will achieve/maintain normal respiratory rate/effort  Description: Respiratory rate and effort will be within normal limits for the patient  8/12/2020 1830 by Andree Ortega RN  Outcome: Ongoing  8/12/2020 1124 by Jody Turcios RCP  Outcome: Ongoing     Problem: Nutrition  Goal: Optimal nutrition therapy  8/12/2020 1830 by Andree Ortega RN  Outcome: Ongoing  8/12/2020 1544 by Reji Nixon RD, LD  Outcome: Ongoing  Note: Nutrition Problem #1: Inadequate oral intake  Intervention: Food and/or Nutrient Delivery: Continue Current Diet, Start Oral Nutrition Supplement  Nutritional Goals: Meet greater than 50% of recommended nutritional needs

## 2020-08-12 NOTE — PROGRESS NOTES
[facility] [address] [facility] Patient  Today's Date and Time: 8/12/2020, 5:03 PM    Impression :   · COVID 19 Suspect  · COVID 19 Confirmed Infection  · COVID tested [facility] on 8/5/2020 positive  · High Inflammatory markers  · BABAK on CKD  · DM insulin dependent  · HTN  · Myelodysplastic Syndrome  · Chronic back pain    Recommendations:   · Diuresis  · Completed Remdesivir 8/5-->8/9  · Completed Decadron 8/5-8/10  · Received immune plasma on 8-6-20. · CXR 8-6-20 shows worsening of infiltrate. Medical Decision Making/Summary/Discussion:8/12/2020     · Patient admitted with suspected COVID 19 infection  · Covid test confirmed positive. · Multiple co morbidities. Pt with hypoxia, requiring 15L NRB and BABAK  · Transferred [facility] to [facility] for further care  · Plan to start Remdesivir 8/5-8/9  · Start Decadron 6mg IV daily 8/5-8/10  · Patient has agreed to convalescent plasma  · Received immune plasma on 8-6-20 without any complications  · Pt is clear that he is a DRN CCA - no intubation or chest compressions   · 8-9-20 Drop in 02 sat. Bumex started, with good response  Infection Control Recommendations   · Universal Precautions  · Airborne isolation  · Droplet Isolation    Antimicrobial Stewardship Recommendations     · Simplification of therapy  · Targeted therapy    Coordination of Outpatient Care:   · Estimated Length of IV antimicrobials:8/5-8/9  · Patient will need Midline Catheter Insertion: TBD  · Patient will need PICC line Insertion: No  · Patient will need: Home IV , [place],  SNF,  LTAC:TBD  · Patient will need outpatient wound care:No    Chief complaint/reason for consultation:   · Concern for COVID infection      History of Present Illness:   Pandora Hodgkin is a 80y.o.-year-old  male who was initially admitted on 8/5/2020.  Patient seen at the request of Dr. Katharine Libman:    Patient is an 79 yo gentleman with a PMH of DM II - insulin dependent, HTN, CKD and a new diagnosis of myelodysplastic syndrome. He was hospitalized and [facility] from July 28-30 after falling at home. Today (8-5) he was at his Hematologists office and reported that he was SOB. His SaO2 was in the low 80's and he was transported to the ED. Per the notes, a CXR showed patchy infiltrates, pt was tachycardic with SaO2 85% on RA. Labs showed  Cr 2.47  D-dimer 1.75  [id number]    AST 74  ALT 36    Viral PCR negative  COVID positive    Pt was transferred to [facility] for further care. He is AAO, reporting that he has been feeling very weak at home. No SOB, but nausea and diarrhea. Poor appetite, little PO intake. He states that he fell on 8-4-20 due to his weakness. Pt denies fevers or chills, no cough, no sputum production    He is clear that he does not wish to be intubated or have chest compressions. He does wish full treatment until either of those is necessary. CURRENT EVALUATION :8/12/2020    Patient evaluated and examined in the ICU. Pt is AA, intermittently agitated and confused  Now on high flow O2 30L 100% high flow  Periods of agitation during the night  Tachycardia improved    Afebrile  VSS    Patient exhibiting respiratory distress Yes  Respiratory secretions: No    Patient receiving supplemental oxygen. 15L NRB-->6L min-->15L NRB-> high flow 30L 100%  02 sat  99-->87->92->90-->93%   At rest, drops to 87 % with activity  RR 24-->32->21->22-->19      QTc: 486    (Definition of High Risk Comorbid Conditions: Asthma, COPD, Heart Failure of any cause, DM, Hematologic malignancy, Immunocompromised taking >20 mg/day Prednisone).     Baseline Number of High Risk Comorbid conditions:    5  Respiratory Support Level Score: (Room Air= 1 ;Supplemental 02 1L to 15 L/min= 2; Intubation and mechanical Ventilation = 3; Failure to support Ventilatory needs resulting in death =4)   2    NEWS Score: 0-4 Low risk group; 5-6: Medium risk group; 7 or above: High risk group  Parameters 3 2 1 0 1 2 3   Age    < 72   ? 65   RR ? 8  9-11 12-20  21-24 ? 25   O2 Sats ? 91 92-93 94-95 ? 96      Suppl O2  Yes  No      SBP ? 90  101-110 111-219   ? 220   HR ? 40  41-50 51-90  111-130 ? 131   Consciousness    Alert   Drowsiness, lethargy, or confusion   Temperature ? 35.0 C (95.0 F)  35.1-36.0 C 95.1-96.9 F 36.1-38.0 C 97.0-100.4 F 38.1-39.0 C 100.5-102.3 F ? 39.1 C ? 102.4 F      NEWS Score:   8-5-20: 12 high risk    Overall Daily Picture:    worsening     Presence of secondary bacterial Infection:  No   Additional antibiotics: None     Labs, X rays reviewed: 8/12/2020    BUN: 56--62->66->69  Cr: 2.43-->1.15-->1.39->1.48  D dimer 1.75    Pro BNP 1019    WBC: 9.6-->9.2->12.5->11.0-->8.4  Hb: 9.9-->10.8->11.8->13.2--.12.4  Plat: 116-->134-->98->113->113    Absolute Neutrophils:5.59  Absolute Lymphocytes:0.52  Neutrophil/Lymphocyte Ratio: 10.7->14.1 very high risk    CRP: 140.9  Ferritin: 7,343-->5676  LDH: 803    Pro Calcitonin:0.32    Cultures:  Urine:  ·   Blood:  ·   Sputum :  ·   Wound:       CXR:   · 8/6/2020: Bilateral parenchymal abnormalities, superimposed vascular congestion and bibasilar atelectasis. Small left pleural effusion  8-10: Increased subcutaneous air. Questionable free air within the upper abdomen. Partial clearing of bilateral lung opacities. ·   CAT:    Discussed with patient, RN, IM.    8-10      8/6/2020    I have personally reviewed the past medical history, past surgical history, medications, social history, and family history, and I have updated the database accordingly.   Past Medical History:     Past Medical History:   Diagnosis Date    Aortic stenosis     Essential hypertension     Hyperlipidemia     Insulin-requiring or dependent type II diabetes mellitus ([facility]     MDS (myelodysplastic syndrome) ([facility]     Patient in clinical research study 08/06/2020    Convalescent plasma Expected date of completion 9/6/2020       Past Surgical  History:     Past Surgical History:   Procedure Laterality Date    APPENDECTOMY      COLECTOMY      LUMBAR LAMINECTOMY  2008    OTHER SURGICAL HISTORY  02/05/2020    Right shoulder joint injection and right L4/L5 TFESI    OTHER SURGICAL HISTORY Right 10/09/2019    Right L4 and 5 TFESI    PARATHYROIDECTOMY         Medications:      sodium bicarbonate  650 mg Oral 4x Daily    QUEtiapine  25 mg Oral BID    bumetanide  1 mg Oral Daily    enoxaparin  30 mg Subcutaneous BID    insulin glargine  30 Units Subcutaneous Nightly    sodium chloride  20 mL Intravenous Once    amLODIPine  5 mg Oral Daily    sodium chloride flush  10 mL Intravenous 2 times per day    insulin lispro  0-12 Units Subcutaneous TID WC    insulin lispro  0-6 Units Subcutaneous Nightly    metoprolol tartrate  50 mg Oral BID    calcium citrate  950 mg Oral Daily    therapeutic multivitamin-minerals  1 tablet Oral Daily    pantoprazole  40 mg Oral QAM AC       Social History:     Social History     Socioeconomic History    Marital status:      Spouse name: Not on file    Number of children: Not on file    Years of education: Not on file    Highest education level: Not on file   Occupational History    Not on file   Social Needs    Financial resource strain: Not on file    Food insecurity     Worry: Not on file     Inability: Not on file    Transportation needs     Medical: Not on file     Non-medical: Not on file   Tobacco Use    Smoking status: Former Smoker     Types: Cigarettes     Start date: 1967     Last attempt to quit: 2017     Years since quitting: 3.6   Substance and Sexual Activity    Alcohol use:  Yes     Alcohol/week: 1.0 standard drinks     Types: 1 Glasses of wine per week     Frequency: Monthly or less     Drinks per session: 1 or 2    Drug use: Not on file    Sexual activity: Not on file   Lifestyle    Physical activity     Days per week: Not on file Minutes per session: Not on file    Stress: Not on file   Relationships    Social connections     Talks on phone: Not on file     Gets together: Not on file     Attends Muslim service: Not on file     Active member of club or organization: Not on file     Attends meetings of clubs or organizations: Not on file     Relationship status: Not on file    Intimate partner violence     Fear of current or ex partner: Not on file     Emotionally abused: Not on file     Physically abused: Not on file     Forced sexual activity: Not on file   Other Topics Concern    Not on file   Social History Narrative    Not on file       Family History:     Family History   Problem Relation Age of Onset    Breast Cancer Father     Prostate Cancer Father     Prostate Cancer Brother         Allergies:   Lovaza [omega-3-acid ethyl esters] and Metformin and related     Review of Systems:     Constitutional: No fevers or chills. reports breathing is 'better'  Head: No headaches  Eyes: No double vision or blurry vision. No conjunctival inflammation. ENT: No sore throat or runny nose. . No hearing loss, tinnitus or vertigo. Cardiovascular: No chest pain or palpitations. No shortness of breath. No SANTIAGO  Lung: No shortness of breath or cough. No sputum production  Abdomen:  nausea, diarrhea, no vomiting or abdominal pain. Inocente Sorrow No cramps. Genitourinary: No increased urinary frequency, or dysuria. No hematuria. No suprapubic or CVA pain  Musculoskeletal: Chronic back pain  Hematologic: Rt toes with ecchymosis. Pt able to wriggle all toes without pain  Neurologic: No headache, weakness, numbness, or tingling. Integument: No rash, no ulcers. Psychiatric: No depression. Endocrine: No polyuria, no polydipsia, no polyphagia.     Physical Examination :     Patient Vitals for the past 8 hrs:   BP Temp Temp src Pulse Resp   08/12/20 1200 (!) 111/51 97.8 °F (36.6 °C) Axillary 78 26     General Appearance: Awake, alert, and in mild distress  Head: ORDERING SYSTEM PROVIDED HISTORY: Sob    TECHNOLOGIST PROVIDED HISTORY:    Sob    Reason for Exam: shortness of breath upright port         FINDINGS:    Increased subcutaneous air along the upper chest and lower neck.  Possible    free air within the upper abdomen.         Partial clearing of bilateral lung opacities.  Cardiomegaly.              Impression    Increased subcutaneous air.         Questionable free air within the upper abdomen.         Partial clearing of bilateral lung opacities. EXAMINATION:    ONE XRAY VIEW OF THE CHEST         8/6/2020 2:56 pm         COMPARISON:    AP chest from 08/05/2020         HISTORY:    ORDERING SYSTEM PROVIDED HISTORY: COVID 19    TECHNOLOGIST PROVIDED HISTORY:    COVID 19    Acuity: Unknown    Type of Exam: Unknown         Additional history of diabetes and hypertension.         FINDINGS:    Overlying ECG monitor leads and gown snaps.         Mildly enlarged cardiac silhouette with a left ventricular configuration. Mediastinal structures midline unchanged, again with heavy calcification and    elongation thoracic aorta.         Diffuse airspace and interstitial abnormalities, denser in the lower lung    zones, slightly greater on the left, compatible with a history.  Some    superimposed vascular congestion and bibasilar atelectasis likely.  Possible    small left pleural effusion.  No large effusion.  No pneumothorax.         Bones appear unchanged.              Impression    Bilateral parenchymal abnormalities, compatible with the history and perhaps    some superimposed vascular congestion and bibasilar atelectasis.  Possible    tiny left pleural effusion.         Medical Decision [id number]:       Medical Decision Making-Other:     Note:  · Labs, medications, radiologic studies were reviewed with personal review of films  · Moderate Large amounts of data were reviewed  · Discussed with nursing Staff, Discharge planner  · Infection Control and Prevention measures reviewed  · All prior entries were reviewed  · Administer medications as ordered  · Prognosis: Guarded  · Discharge planning reviewed  · Follow up as outpatient. Thank you for allowing us to participate in the care of this patient. Please call with questions.     Calli Garcia MD  Pager: [phone] - Office: [phone]

## 2020-08-12 NOTE — PLAN OF CARE
Problem: OXYGENATION/RESPIRATORY FUNCTION  Goal: Patient will maintain patent airway  8/11/2020 2059 by Zarina Solis RCP  Outcome: Ongoing  Goal: Patient will achieve/maintain normal respiratory rate/effort  Description: Respiratory rate and effort will be within normal limits for the patient  8/11/2020 2059 by Zarina Solis RCP  Outcome: Ongoing

## 2020-08-13 NOTE — PROGRESS NOTES
[facility] [address] [facility]    Progress Note    8/13/2020    12:20 PM    Name:   Jone Levin  MRN:     [id number]     [place]:      [de-identified]   Room:   [facility] [place] [address]  IP Day:  8  Admit Date:  8/5/2020  2:22 PM    PCP:   Eli Lawson  Code Status:  DNR-CCA    Subjective:     C/C: sob  Interval History Status: worsened. Feels worse today; states he needs to have BM    RN told me he desatted to 76s when mobilized by therapy    Had to go up on hfnc settings    Brief History:     Per my partner:  \"Per my associate:     At 5 together grossly the second is wondering if there are some notes like you get interrupted with talking pages about this patient's ongoing Porsha Calk a 80 y. o. male who presents with shortness of breath.  He was directly admitted from [facility] ED Aug 5 for the management of Acute respiratory failure due to COVID-19.     The patient was admitted to [facility] last week after having a syncopal event. Arin Elkins believes this was attributed to his b/p being low.  During that stay he was diagnosed with MDS.  Today the pt was being evaluated by hematology when he reported worsening shortness of breath.  Oxygen sats were noted to be in low 80's.  Pt was sent to ED where he tested + for covid.  CXR showed patchy infiltrates.  Pt was started on 4L/nc but required non rebreather prior to transfer. Arin Elkins also received Decadron, Zithromax and Duoneb.       The hospitalist service did not feel comfortable admitting him at [facility] is a DNRCC-A and does not want CPR or intubation. \"    · Completed Remdesivir 8/5-->8/9  · Completed Decadron 8/5-8/10  · Received immune plasma on 8-6-20.     Review of Systems:     Constitutional:  negative for chills, fevers, sweats  Respiratory:  positive for cough, dyspnea on exertion, shortness of breath  Cardiovascular:  negative for chest pain, chest pressure/discomfort, lower extremity edema, palpitations  Gastrointestinal:  negative for constipation, diarrhea, nausea, vomiting  Neurological:  negative for dizziness, headache    Medications: Allergies: Allergies   Allergen Reactions    Lovaza [Omega-3-Acid Ethyl Esters] Anaphylaxis    Metformin And Related Anaphylaxis       Current Meds:   Scheduled Meds:    sodium bicarbonate  650 mg Oral 4x Daily    QUEtiapine  25 mg Oral BID    bumetanide  1 mg Oral Daily    enoxaparin  30 mg Subcutaneous BID    insulin glargine  30 Units Subcutaneous Nightly    sodium chloride  20 mL Intravenous Once    amLODIPine  5 mg Oral Daily    sodium chloride flush  10 mL Intravenous 2 times per day    insulin lispro  0-12 Units Subcutaneous TID WC    insulin lispro  0-6 Units Subcutaneous Nightly    metoprolol tartrate  50 mg Oral BID    calcium citrate  950 mg Oral Daily    therapeutic multivitamin-minerals  1 tablet Oral Daily    pantoprazole  40 mg Oral QAM AC     Continuous Infusions:    dextrose       PRN Meds: LORazepam, baclofen, glucose, dextrose, glucagon (rDNA), dextrose, sodium chloride flush, potassium chloride **OR** potassium alternative oral replacement **OR** potassium chloride, magnesium sulfate, acetaminophen **OR** acetaminophen, polyethylene glycol, promethazine **OR** ondansetron, traMADol, sodium chloride    Data:     Past Medical History:   has a past medical history of Aortic stenosis, Essential hypertension, Hyperlipidemia, Insulin-requiring or dependent type II diabetes mellitus ([facility] [address] MDS (myelodysplastic syndrome) ([facility] [address] and Patient in clinical research study. Social History:   reports that he quit smoking about 3 years ago. His smoking use included cigarettes. He started smoking about 53 years ago. He does not have any smokeless tobacco history on file. He reports current alcohol use of about 1.0 standard drinks of alcohol per week.      Family History:   Family History   Problem Relation Age of Onset    Breast Cancer Father     Prostate Cancer Father     Prostate Cancer Brother        Vitals:  BP (!) 124/56   Pulse 71   Temp 98 °F (36.7 °C) (Oral)   Resp 19   Ht 5' 8\" (1.727 m)   Wt 160 lb 0.9 oz (72.6 kg)   SpO2 (!) 82%   BMI 24.34 kg/m²   Temp (24hrs), Av.4 °F (36.3 °C), Min:96.4 °F (35.8 °C), Max:98 °F (36.7 °C)    Recent Labs     20  1704 20  0838 20  0916   POCGLU 158* 104 57* 80       I/O (24Hr): Intake/Output Summary (Last 24 hours) at 2020 1220  Last data filed at 2020 0600  Gross per 24 hour   Intake --   Output 950 ml   Net -950 ml       Labs:  Hematology:  Recent Labs     20  0442 20  0749   WBC 11.0 8.4   RBC 4.29 4.05*   HGB 13.2 12.4*   HCT 40.5* 38.4*   MCV 94.4 94.8   MCH 30.8 30.6   MCHC 32.6 32.3   RDW 13.7 14.1   * 113*   MPV 10.5 12.7   DDIMER  --  1.02     Chemistry:  Recent Labs     20  0442      K 4.5      CO2 21   GLUCOSE 161*   BUN 69*   CREATININE 1.48*   ANIONGAP 17   LABGLOM 45*   GFRAA 54*   CALCIUM 8.9     Recent Labs     20  0838 20  1205 20  1704 20  0838 20  0916   POCGLU 148* 172* 158* 104 57* 80     ABG:  Lab Results   Component Value Date    POCPH 7.469 08/10/2020    POCPCO2 27.0 08/10/2020    POCPO2 69.8 08/10/2020    POCHCO3 19.6 08/10/2020    NBEA 3 08/10/2020    PBEA NOT REPORTED 08/10/2020    EQS7IHP 21 08/10/2020    GTQZ2HZO 95 08/10/2020    FIO2 15.0 08/10/2020     No results found for: SPECIAL  No results found for: CULTURE    Radiology:  Xr Abdomen (kub) (single Ap View)    Result Date: 8/10/2020  Somewhat degraded by motion. No large quantity free air in the abdomen. Tiny amount not excluded on this study. RECOMMENDATION: If there is clinical concern for free air, consider additional left-side-down decubitus view or CT. Xr Chest Portable    Result Date: 8/10/2020  Increased subcutaneous air. Questionable free air within the upper abdomen. Partial clearing of bilateral lung opacities. Xr Chest Portable    Result Date: 8/6/2020  Bilateral parenchymal abnormalities, compatible with the history and perhaps some superimposed vascular congestion and bibasilar atelectasis. Possible tiny left pleural effusion. Physical Examination:        General appearance:  alert, cooperative and no distress  Mental Status:  oriented to person, place and time and normal affect  Lungs:  clear to auscultation bilaterally, normal effort on hfnc  Heart:  regular rate and rhythm, no murmur  Abdomen:  soft, nontender, nondistended, normal bowel sounds, no masses, hepatomegaly, splenomegaly  Extremities:  no edema, redness, tenderness in the calves  Skin:  no gross lesions, rashes, induration    Assessment:        Hospital Problems           Last Modified POA    * (Principal) Acute respiratory failure due to COVID-19 8/6/2020 Yes    Acute respiratory failure with hypoxia ([address] 8/6/2020 Yes    Pneumonia due to COVID-19 virus 8/6/2020 Yes    Delirium 8/10/2020 No    CKD (chronic kidney disease), stage III ([address] 8/9/2020 Yes    MDS (myelodysplastic syndrome) ([address] 8/6/2020 Yes    Insulin-requiring or dependent type II diabetes mellitus ([address] 8/6/2020 Yes    Hyperlipidemia 8/6/2020 Yes    Essential hypertension 8/6/2020 Yes    Aortic stenosis 8/5/2020 Yes          Plan:        1. Wean hfnc as [place]  2. Dc planning to ltac when available-looks like he may need hfnc for a while yet  3.  Will check cxr today to make sure no secondary infection as his o2 requirements went up overnight    Guido Rose, DO  8/13/2020  12:20 PM

## 2020-08-13 NOTE — CARE COORDINATION
TRANSITIONAL CARE PLANNING/ 2 [facility] Day: 8    Reason for Admission: Pneumonia [J18.9]     Treatment Plan of Care: dnrcca, high flow 60%    Tests/Procedures still needed:     Barriers to Discharge: spoke to [address] [place] [address] [address] with sarah patient will need to stay till Monday & be re-evaluated for admission at that time     Readmission Risk            Risk of Unplanned Readmission:      22        Referrals Made: [facility] [place] following, [facility] (left vm with admissions)

## 2020-08-13 NOTE — PLAN OF CARE
[facility] [address] [facility]    Second Visit Note  For more detailed information please refer to the progress note of the day      8/13/2020    5:41 PM    Name:   Clayton Longoria  MRN:     [id number]     [place]:      [de-identified]   Room:   [facility]001-01   Day:  8  Admit Date:  8/5/2020  2:22 PM    PCP:   Savana Florez  Code Status:  DNR-CCA      Pt vitals were reviewed   New labs were reviewed   Patient was seen    Updated plan :     1. Tried to d/w patient re: code status and hospice but he is incoherent. He had made clear previously he does NOT want to be intubated  2. Then I called and spoke to daughter-she and her brothers have a ZOOM meeting scheduled for 630pm and after that they will likely withdraw support. She will call back after meeting to confirm this plan. She agrees on dnrcc now.   Will get hospice involved if he survives overnight after withdrawal.  She is very grateful for all we have done for him and knows she is following his wishes and does not want him to suffer      Barak [place] Blood, DO  8/13/2020  5:41 PM

## 2020-08-13 NOTE — PROGRESS NOTES
The patient was able to stand and move to the chair with the RN this am.  He was able to verbalize his needs and answer questions. The patient although weak was able to eat his some of his meal with assistance. He was not able to tolerate thick liquids without coughing. He then gradually began to desaturate through out the morning which required change in the rate of the high flow from the resp tx. The patient expressed his desire to return to bed at around 1300. He was no longer able to stand with the assistance of one and two RN pivoted the patient back to bed. He continued to gradually decline. Dr. Rose was notified and requested pulmonary to review the patient for further recommendations. Dr. Valencia Gao phoned the RN, reviewed the chart, discussed the patient with both the RN and resp thy, and recommended [facility]. Dr. Rose updated and assessed the patient at the bedside. RN also phoned the daughter; Gerald Souza, and spiritual care to set a zoom with the patient and his family which was to be [phone].

## 2020-08-13 NOTE — PROGRESS NOTES
The patients children were able to do a zoom and have decided to withdrawal care per the daughter David Lizarraga over the phone. Dr. Rose updated through [employer].

## 2020-08-13 NOTE — PROGRESS NOTES
[facility] [address] [facility] Patient  Today's Date and Time: 8/13/2020, 9:23 AM    Impression :   · COVID 19 Suspect  · COVID 19 Confirmed Infection  · COVID tested [facility] on 8/5/2020 positive  · High Inflammatory markers  · BABAK on CKD  · DM insulin dependent  · HTN  · Myelodysplastic Syndrome  · Chronic back pain    Recommendations:   · Diuresis  · Completed Remdesivir 8/5-->8/9  · Completed Decadron 8/5-8/10  · Received immune plasma on 8-6-20. · CXR 8-6-20 shows worsening of infiltrate. Medical Decision Making/Summary/Discussion:8/13/2020     · Patient admitted with suspected COVID 19 infection  · Covid test confirmed positive. · Multiple co morbidities. Pt with hypoxia, requiring 15L NRB and BABAK  · Transferred [facility] for further care  · Plan to start Remdesivir 8/5-8/9  · Start Decadron 6mg IV daily 8/5-8/10  · Patient has agreed to convalescent plasma  · Received immune plasma on 8-6-20 without any complications  · Pt is clear that he is a DRN CCA - no intubation or chest compressions   · 8-9-20 Drop in 02 sat. Bumex started, with good response  Infection Control Recommendations   · Universal Precautions  · Airborne isolation  · Droplet Isolation    Antimicrobial Stewardship Recommendations     · Simplification of therapy  · Targeted therapy    Coordination of Outpatient Care:   · Estimated Length of IV antimicrobials:8/5-8/9  · Patient will need Midline Catheter Insertion: TBD  · Patient will need PICC line Insertion: No  · Patient will need: Home IV , [place],  SNF,  LTAC:TBD  · Patient will need outpatient wound care:No    Chief complaint/reason for consultation:   · Concern for COVID infection      History of Present Illness:   Eric Zamora is a 80y.o.-year-old  male who was initially admitted on 8/5/2020.  Patient seen at the request of Dr. Nimisha Purcell:    Patient is an 79 yo gentleman with a PMH of DM II - insulin dependent, HTN, CKD and a new diagnosis of myelodysplastic syndrome. He was hospitalized and [facility] from July 28-30 after falling at home. Today (8-5) he was at his Hematologists office and reported that he was SOB. His SaO2 was in the low 80's and he was transported to the ED. Per the notes, a CXR showed patchy infiltrates, pt was tachycardic with SaO2 85% on RA. Labs showed  Cr 2.47  D-dimer 1.75  [id number]    AST 74  ALT 36    Viral PCR negative  COVID positive    Pt was transferred to [facility] for further care. He is AAO, reporting that he has been feeling very weak at home. No SOB, but nausea and diarrhea. Poor appetite, little PO intake. He states that he fell on 8-4-20 due to his weakness. Pt denies fevers or chills, no cough, no sputum production    He is clear that he does not wish to be intubated or have chest compressions. He does wish full treatment until either of those is necessary. CURRENT EVALUATION :8/13/2020    Patient evaluated and examined in the ICU. Pt is aspirating thickened liquids, tolerating solids    Pt is AA, intermittently agitated and confused  Now on high flow O2,   Currently on 20L 80%  Periods of agitation during the night    Afebrile  VSS    Patient exhibiting respiratory distress Yes  Respiratory secretions: No    Patient receiving supplemental oxygen. 15L NRB-->6L min-->15L NRB-> high flow 30L 100%->20L 80%  02 sat  99-->87->92->90-->93->92%   At rest, drops to 87 % with activity  RR 24-->32->21->22->19->21    QTc: 486    (Definition of High Risk Comorbid Conditions: Asthma, COPD, Heart Failure of any cause, DM, Hematologic malignancy, Immunocompromised taking >20 mg/day Prednisone).     Baseline Number of High Risk Comorbid conditions:    5  Respiratory Support Level Score: (Room Air= 1 ;Supplemental 02 1L to 15 L/min= 2; Intubation and mechanical Ventilation = 3; Failure to support Ventilatory needs resulting in death =4)   2    NEWS Score: 0-4 Low risk group; 5-6: Medium risk group; 7 or above: High risk group  Parameters 3 2 1 0 1 2 3   Age    < 65   ? 65   RR ? 8  9-11 12-20  21-24 ? 25   O2 Sats ? 91 92-93 94-95 ? 96      Suppl O2  Yes  No      SBP ? 90  101-110 111-219   ? 220   HR ? 40  41-50 51-90  111-130 ? 131   Consciousness    Alert   Drowsiness, lethargy, or confusion   Temperature ? 35.0 C (95.0 F)  35.1-36.0 C 95.1-96.9 F 36.1-38.0 C 97.0-100.4 F 38.1-39.0 C 100.5-102.3 F ? 39.1 C ? 102.4 F      NEWS Score:   8-5-20: 12 high risk    Overall Daily Picture:    Unchanged to very slow improvement    Presence of secondary bacterial Infection:  No   Additional antibiotics: None     Labs, X rays reviewed: 8/13/2020    BUN: 56--62->66->69  Cr: 2.43-->1.15-->1.39->1.48  D dimer 1.75    Pro BNP 1019    WBC: 9.6-->9.2->12.5->11.0-->8.4  Hb: 9.9-->10.8->11.8->13.2--.12.4  Plat: 116-->134-->98->113->113    Absolute Neutrophils:5.59  Absolute Lymphocytes:0.52  Neutrophil/Lymphocyte Ratio: 10.7->14.1 very high risk    CRP: 140.9  Ferritin: 7,343-->5676  LDH: 803    Pro Calcitonin:0.32    Cultures:  Urine:  ·   Blood:  ·   Sputum :  ·   Wound:       CXR:   · 8/6/2020: Bilateral parenchymal abnormalities, superimposed vascular congestion and bibasilar atelectasis. Small left pleural effusion  8-10: Increased subcutaneous air. Questionable free air within the upper abdomen. Partial clearing of bilateral lung opacities. ·   CAT:    Discussed with patient, RN, IM.    8-10      8/6/2020    I have personally reviewed the past medical history, past surgical history, medications, social history, and family history, and I have updated the database accordingly.   Past Medical History:     Past Medical History:   Diagnosis Date    Aortic stenosis     Essential hypertension     Hyperlipidemia     Insulin-requiring or dependent type II diabetes mellitus [facility]     [facility]     Patient in clinical research study 08/06/2020    Convalescent plasma Expected date of completion 9/6/2020       Past Surgical  History:     Past Surgical History:   Procedure Laterality Date    APPENDECTOMY      COLECTOMY      LUMBAR LAMINECTOMY  2008    OTHER SURGICAL HISTORY  02/05/2020    Right shoulder joint injection and right L4/L5 TFESI    OTHER SURGICAL HISTORY Right 10/09/2019    Right L4 and 5 TFESI    PARATHYROIDECTOMY         Medications:      sodium bicarbonate  650 mg Oral 4x Daily    QUEtiapine  25 mg Oral BID    bumetanide  1 mg Oral Daily    enoxaparin  30 mg Subcutaneous BID    insulin glargine  30 Units Subcutaneous Nightly    sodium chloride  20 mL Intravenous Once    amLODIPine  5 mg Oral Daily    sodium chloride flush  10 mL Intravenous 2 times per day    insulin lispro  0-12 Units Subcutaneous TID WC    insulin lispro  0-6 Units Subcutaneous Nightly    metoprolol tartrate  50 mg Oral BID    calcium citrate  950 mg Oral Daily    therapeutic multivitamin-minerals  1 tablet Oral Daily    pantoprazole  40 mg Oral QAM AC       Social History:     Social History     Socioeconomic History    Marital status:      Spouse name: Not on file    Number of children: Not on file    Years of education: Not on file    Highest education level: Not on file   Occupational History    Not on file   Social Needs    Financial resource strain: Not on file    Food insecurity     Worry: Not on file     Inability: Not on file    Transportation needs     Medical: Not on file     Non-medical: Not on file   Tobacco Use    Smoking status: Former Smoker     Types: Cigarettes     Start date: 1967     Last attempt to quit: 2017     Years since quitting: 3.6   Substance and Sexual Activity    Alcohol use:  Yes     Alcohol/week: 1.0 standard drinks     Types: 1 Glasses of wine per week     Frequency: Monthly or less     Drinks per session: 1 or 2    Drug use: Not on file    Sexual activity: Not on file   Lifestyle    Physical activity     Days per week: Not on file     Minutes per session: Not on file    Stress: Not on file   Relationships    Social connections     Talks on phone: Not on file     Gets together: Not on file     Attends Yazidi service: Not on file     Active member of club or organization: Not on file     Attends meetings of clubs or organizations: Not on file     Relationship status: Not on file    Intimate partner violence     Fear of current or ex partner: Not on file     Emotionally abused: Not on file     Physically abused: Not on file     Forced sexual activity: Not on file   Other Topics Concern    Not on file   Social History Narrative    Not on file       Family History:     Family History   Problem Relation Age of Onset    Breast Cancer Father     Prostate Cancer Father     Prostate Cancer Brother         Allergies:   Lovaza [omega-3-acid ethyl esters] and Metformin and related     Review of Systems:     Constitutional: No fevers or chills. reports breathing is 'better'  Head: No headaches  Eyes: No double vision or blurry vision. No conjunctival inflammation. ENT: No sore throat or runny nose. . No hearing loss, tinnitus or vertigo. Cardiovascular: No chest pain or palpitations. No shortness of breath. No SANTIAGO  Lung: No shortness of breath or cough. No sputum production  Abdomen:  nausea, diarrhea, no vomiting or abdominal pain. Marianna Boop No cramps. Genitourinary: No increased urinary frequency, or dysuria. No hematuria. No suprapubic or CVA pain  Musculoskeletal: Chronic back pain  Hematologic: Rt toes with ecchymosis. Pt able to wriggle all toes without pain  Neurologic: No headache, weakness, numbness, or tingling. Integument: No rash, no ulcers. Psychiatric: No depression. Endocrine: No polyuria, no polydipsia, no polyphagia.     Physical Examination :     Patient Vitals for the past 8 hrs:   BP Temp Temp src Pulse Resp SpO2   08/13/20 0414 -- -- -- -- 16 94 %   08/13/20 0308 (!) 124/56 98 °F (36.7 °C) Oral 71 19 93 %     General Appearance: Awake, alert, and in mild distress  Head:  Normocephalic, no trauma  Eyes: Pupils equal, round, reactive to light and accommodation; extraocular movements intact; sclera anicteric; conjunctivae pink. No embolic phenomena. ENT: Oropharynx clear, without erythema, exudate, or thrush. No tenderness of sinuses. Mouth/throat: mucosa pink and moist. No lesions. Dentition in good repair. Neck:Supple, without lymphadenopathy. Thyroid normal, No bruits. Pulmonary/Chest: Clear to auscultation, generally diminished throughout without wheezes, rales, or rhonchi. No dullness to percussion. Frequent dry cough  Cardiovascular: Regular rate and rhythm with ectopic beats  Abdomen: Soft, non tender. Bowel sounds normal. No organomegaly  All four Extremities: Bilateral mild edema  Neurologic: No gross sensory or motor deficits. Skin: Warm and dry with good turgor. signs of peripheral arterial  insufficiency. No ulcerations. No open wounds. Medical Decision Making -Laboratory:   I have independently reviewed/ordered the following labs:    CBC with Differential:   Recent Labs     08/11/20  0442 08/12/20  0749   WBC 11.0 8.4   HGB 13.2 12.4*   HCT 40.5* 38.4*   * 113*   LYMPHOPCT 7* 5*   MONOPCT 7 7     BMP:   Recent Labs     08/11/20  0442      K 4.5      CO2 21   BUN 69*   CREATININE 1.48*     Hepatic Function Panel:   No results for input(s): PROT, LABALBU, BILIDIR, IBILI, BILITOT, ALKPHOS, ALT, AST in the last 72 hours. No results for input(s): RPR in the last 72 hours. No results for input(s): HIV in the last 72 hours. No results for input(s): BC in the last 72 hours.   Lab Results   Component Value Date    RBC 4.05 08/12/2020    WBC 8.4 08/12/2020     Lab Results   Component Value Date    CREATININE 1.48 08/11/2020    GLUCOSE 161 08/11/2020       Medical Decision Making-Imaging:     EXAMINATION:    ONE XRAY VIEW OF THE CHEST      8/10/2020 6:16 am         COMPARISON:    August 6, 2020         HISTORY:    ORDERING SYSTEM PROVIDED HISTORY: Sob    TECHNOLOGIST PROVIDED HISTORY:    Sob    Reason for Exam: shortness of breath upright port         FINDINGS:    Increased subcutaneous air along the upper chest and lower neck.  Possible    free air within the upper abdomen.         Partial clearing of bilateral lung opacities.  Cardiomegaly.              Impression    Increased subcutaneous air.         Questionable free air within the upper abdomen.         Partial clearing of bilateral lung opacities. EXAMINATION:    ONE XRAY VIEW OF THE CHEST         8/6/2020 2:56 pm         COMPARISON:    AP chest from 08/05/2020         HISTORY:    ORDERING SYSTEM PROVIDED HISTORY: COVID 19    TECHNOLOGIST PROVIDED HISTORY:    COVID 19    Acuity: Unknown    Type of Exam: Unknown         Additional history of diabetes and hypertension.         FINDINGS:    Overlying ECG monitor leads and gown snaps.         Mildly enlarged cardiac silhouette with a left ventricular configuration. Mediastinal structures midline unchanged, again with heavy calcification and    elongation thoracic aorta.         Diffuse airspace and interstitial abnormalities, denser in the lower lung    zones, slightly greater on the left, compatible with a history.  Some    superimposed vascular congestion and bibasilar atelectasis likely.  Possible    small left pleural effusion.  No large effusion.  No pneumothorax.         Bones appear unchanged.              Impression    Bilateral parenchymal abnormalities, compatible with the history and perhaps    some superimposed vascular congestion and bibasilar atelectasis.  Possible    tiny left pleural effusion.         Medical Decision [id number]:       Medical Decision Making-Other:     Note:  · Labs, medications, radiologic studies were reviewed with personal review of films  · Moderate Large amounts of data were reviewed  · Discussed with nursing Staff, Discharge planner  · Infection Control and Prevention measures reviewed  · All prior entries were reviewed  · Administer medications as ordered  · Prognosis: Guarded  · Discharge planning reviewed  · Follow up as outpatient. Thank you for allowing us to participate in the care of this patient. Please call with questions.     Sung Rose MD  Pager: [phone] - Office: [phone]

## 2020-08-13 NOTE — PROGRESS NOTES
position/activity restrictions: HI-ALISSON O2 at 15L     Subjective   General  Chart Reviewed: Yes  Response To Previous Treatment: Patient with no complaints from previous session. Family / Caregiver Present: No  Subjective  Subjective: Pt and RN agreeable to PT. Pleasant and cooperative throughout  Pain Screening  Patient Currently in Pain: Denies  Vital Signs  Patient Currently in Pain: Denies       Orientation  Orientation  Overall Orientation Status: Within Functional Limits    Objective   Transfers  Sit to Stand:  Moderate Assistance  Stand to sit: Moderate Assistance  Comment: used RW  Ambulation  Ambulation?: No     Balance  Posture: Fair  Sitting - Static: Good  Sitting - Dynamic: Fair;+  Exercises  Hip Flexion: Seated: BLE x20  Hip Abduction: Seated: BLE x20  Knee Long Arc Quad: Seated: BLE x20  Ankle Pumps: Seated: BLE x20  Upper Extremity: Shoulder scaption, Elbow Flexion, Shoulder ER/IR: BUE x20  Comments: focusing on breathing quality/technique       Goals  Short term goals  Time Frame for Short term goals: 12 visits  Short term goal 1: independent bed mobility without use of bedrails  Short term goal 2: independent transfers  Short term goal 3: independent gait with rw x 50'  Short term goal 4: independent stair ambulation x 2 steps with 1 HR  Patient Goals   Patient goals : breathe better, return home    Plan    Plan  Times per week: 5-6 visits weekly  Times per day: Daily  Current Treatment Recommendations: Strengthening, ROM, Balance Training, Functional Mobility Training, Transfer Training, Endurance Training, Gait Training, Stair training, Safety Education & Training, Positioning  Safety Devices  Type of devices: Call light within reach, Patient at risk for falls, Nurse notified, All fall risk precautions in place, Left in chair, Gait belt  Restraints  Initially in place: No  Restraints: bilateral soft wrist restraints     Therapy Time   Individual Concurrent Group Co-treatment   Time In 0871 Time Out 1008         Minutes 31         Timed Code Treatment Minutes: [address] Joy Jimenez, PTA

## 2020-08-14 NOTE — PLAN OF CARE
PROVIDE ADEQUATE OXYGENATION WITH ACCEPTABLE SP02/ABG'S    [x]  IDENTIFY APPROPRIATE OXYGEN THERAPY  [x]   MONITOR SP02/ABG'S AS NEEDED   [x]   PATIENT EDUCATION AS NEEDED    Making patient as comfortable as possible since code status changed to [facility].

## 2020-08-14 NOTE — DISCHARGE SUMMARY
[facility] [place]     Discharge Summary     Patient ID: Katya Toney  :  1932   MRN: [id number]     ACCOUNT:  [de-identified]   Patient's PCP: Behzad Castañeda Date: 2020   Discharge Date: 20    Length of Stay: 8  Code Status:  dnrcc  Admitting Physician: Chun Ruano DO  Discharge Physician: Adrian Rose DO     Active Discharge Diagnoses:     Hospital Problem Lists:  Principal Problem:    Acute respiratory failure due to COVID-19  Active Problems:    Acute respiratory failure with hypoxia ([facility]    Pneumonia due to COVID-19 virus    Delirium    CKD (chronic kidney disease), stage III ([facility]    MDS (myelodysplastic syndrome) ([facility]    Insulin-requiring or dependent type II diabetes mellitus ([facility]    Hyperlipidemia    Essential hypertension    Aortic stenosis  Resolved Problems:    * No resolved hospital problems. *      Admission Condition:  poor     Discharged Condition:     Hospital Stay:     Hospital Course:  Katya Toney is a 80 y.o. male who was admitted for the management of   Acute respiratory failure due to COVID-19 , presented to ER with No chief complaint on file. This 80 yom present with covid pneumonia and acute respiratory failure from it. · Completed Remdesivir -->  · Completed Decadron -8/10  · Received immune plasma on 20. Had worsening respiratory failure despite high flow nc o2. Seen by ID and pulmonary medicine. Had intermittent bouts of confusion and encephalopathy. He had been very clear he did not wish to be intubated and when he worsened family respected his wishes and made him dnrcc and withdrew support.   He  on  at [facility]. therapeutic interventions: see above    Significant Diagnostic Studies:   Labs / Micro:  CBC:   Lab Results   Component Value Date    WBC 8.4 2020    RBC 4.05 2020    HGB 12.4 2020    HCT 38.4 2020 MCV 94.8 2020    MCH 30.6 2020    MCHC 32.3 2020    RDW 14.1 2020     2020     CMP:    Lab Results   Component Value Date    GLUCOSE 161 2020     2020    K 4.5 2020     2020    CO2 21 2020    BUN 69 2020    CREATININE 1.48 2020    ANIONGAP 17 2020    ALKPHOS 94 2020    ALT 33 2020    AST 49 2020    BILITOT 1.00 2020    LABALBU 3.2 2020    ALBUMIN 1.1 2020    LABGLOM 45 2020    GFRAA 54 2020    GFR      2020    GFR NOT REPORTED 2020    PROT 6.1 2020    CALCIUM 8.9 2020        Radiology:  Xr Abdomen (kub) (single Ap View)    Result Date: 8/10/2020  Somewhat degraded by motion. No large quantity free air in the abdomen. Tiny amount not excluded on this study. RECOMMENDATION: If there is clinical concern for free air, consider additional left-side-down decubitus view or CT. Xr Chest Portable    Result Date: 2020  1. Persistent subcutaneous emphysema with possible pneumomediastinum, although this could be artifactual related to patient motion and overlying subcutaneous emphysema. 2.  Unchanged patchy bibasilar opacities. Xr Chest Portable    Result Date: 8/10/2020  Increased subcutaneous air. Questionable free air within the upper abdomen. Partial clearing of bilateral lung opacities. Consultations:    Consults:     Final Specialist Recommendations/Findings:   IP CONSULT TO INFECTIOUS DISEASES  IP CONSULT TO INFECTIOUS DISEASES  IP CONSULT TO CARDIOLOGY  IP CONSULT TO PULMONOLOGY      The patient was seen and examined on day of discharge and this discharge summary is in conjunction with any daily progress note from day of discharge.     Discharge plan:     Disposition:     Physician Follow Up:   n/a       Requiring Further Evaluation/Follow Up POST HOSPITALIZATION/Incidental Findings: n/a    Diet: n/a    Activity: n/a    Instructions to Patient: n/a    Discharge Medications:      Medication List      STOP taking these medications    amLODIPine 5 MG tablet  Commonly known as:  NORVASC     bisoprolol 10 MG tablet  Commonly known as:  ZEBETA     calcium citrate 950 MG tablet  Commonly known as:  CALCITRATE     Centrum Silver Tabs     chlorthalidone 25 MG tablet  Commonly known as:  HYGROTON     COQ10 PO     LOVAZA PO     omeprazole 20 MG delayed release capsule  Commonly known as:  PRILOSEC     traMADol 50 MG tablet  Commonly known as:  Sukh Alonso     Tresiba 100 UNIT/ML Soln  Generic drug:  Insulin Degludec     Tylenol 325 MG tablet  Generic drug:  acetaminophen     Vitamin D3 250 MCG (29748 UT) Caps     Vitamin K1 Powd            No discharge procedures on file. Time Spent on discharge is  20 mins in patient examination, evaluation, counseling as well as medication reconciliation, prescriptions for required medications, discharge plan and follow up. Electronically signed by   Sonja Rose DO  8/14/2020  1:42 PM      Thank you Dr. Nathaniel Daugherty for the opportunity to be involved in this patient's care.

## 2020-08-14 NOTE — FLOWSHEET NOTE
[facility] [address]   Patient Death Note  DEATH   Shift date: 20    Shift day: Thursday  Shift #: 2                 Room # 3001/3001-01   Name: Ian Rojas            Age: 80 y.o. Gender: male          Oriental orthodox: [place] of Taoism:   Admit Date & Time: 2020  2:22 PM     Referral:  Nurse  Actual date of death: 20   TOD: [address]:  COVID + Pt was made [facility] and  after a zoom call facilitated with family. IS THIS A 'S CASE? No    SPIRITUAL/EMOTIONAL INTERVENTION:  Writer facilitated zoom call and grief care over the phone. Family Received Grief Packet? No, will mail       NAME AND PHONE NUMBER OF DOCTOR SIGNING DEATH CERTIFICATE:  (full name) Dr. Rose (or someone in [facility])     (phone)        Terrell Gomez will contact  home, per [facility] protocol. Copy of COMPLETED Release of Body Form Received? Yes     HOME:  Name: Jerry Hernandez and [employer]: [employer] [facility] [employer]  Phone Number:  [phone]    NEXT OF KIN:  Name: Marea Landau  Relationship: child  Street Address: [address] [place] [address] Dr. Rajendra Sinha: Annemarie [facility]: [place]  Zip code:  [address]  Phone Number: [phone]    ANY FOLLOW-UP NEEDED?  no    IF SO, WHAT?  n/a       20   Encounter Summary   Services provided to: Family   Referral/Consult From: Nurse   Support System Children;Family members   Continue Visiting   (20)   Complexity of Encounter High   Length of Encounter 30 minutes   Spiritual Assessment Completed Yes   Grief and Life Adjustment   Type Death   Assessment Tearful   Intervention Sustaining presence/ Ministry of presence; Active listening;Grief care   Outcome Receptive       Electronically signed by Kristyn Sanford Resident, on 2020 at 8:24 PM.  Gualberto Hansen  [phone]

## 2023-01-10 NOTE — FLOWSHEET NOTE
[address] [facility] 83  PHONE CALL    This routine visit was held over the phone due to COVID-19 pandemic restrictions. Room # 3001/3001-01   Name: Yousuf Leonard              Shift date: 8/13/20  Shift day: Thursday   Shift # 2    Reason for call: End of life    I called the family. Admit Date & Time: 8/5/2020  2:22 PM    Assessment:  Yousuf Leonard is a 80 y.o. male in the hospital because of COVID-19. Writer set up zoom call due to pt decline with children and grandchildren. They are tearful but coping and understanding of pt condition. Intervention:  I introduced myself and my title as  I offered space for family  to express feelings, needs, and concerns and provided a ministry presence over the phone. Outcome:  Family was grateful and receptive. Plan:  Chaplains will remain available to offer spiritual and emotional support as needed. 08/13/20 [id number]   Encounter Summary   Services provided to: Family   Referral/Consult From: Nurse   Support System Children;Family members   Continue Visiting   (8/13/20 zoom)   Complexity of Encounter High   Length of Encounter 30 minutes   Spiritual Assessment Completed Yes   Grief and Life Adjustment   Type End of life   Assessment Approachable;Tearful;Grieving;Coping   Intervention Active listening;Explored feelings, thoughts, concerns;Nurtured hope;Sustaining presence/ Ministry of presence; End of life care; Discussed illness/injury and it's impact   Outcome Connection/belonging;Comfort;Expressed gratitude;Engaged in conversation;Expressed feelings/needs/concerns; Tearful;Receptive         Electronically signed by April Conde Resident, on 8/13/2020 at 6:46 PM.  [facility]  [phone] = A lot of assistance